# Patient Record
Sex: MALE | Race: WHITE | NOT HISPANIC OR LATINO | Employment: PART TIME | URBAN - METROPOLITAN AREA
[De-identification: names, ages, dates, MRNs, and addresses within clinical notes are randomized per-mention and may not be internally consistent; named-entity substitution may affect disease eponyms.]

---

## 2017-08-01 ENCOUNTER — HOSPITAL ENCOUNTER (OUTPATIENT)
Dept: PREADMISSION TESTING | Facility: HOSPITAL | Age: 67
Discharge: HOME | End: 2017-08-01
Attending: UROLOGY | Admitting: UROLOGY

## 2017-08-01 LAB
ANION GAP SERPL CALC-SCNC: 14.3 MMOL/L (ref 10–20)
BASOPHILS # BLD AUTO: 0.1 10*3/UL (ref 0–0.2)
BASOPHILS NFR BLD AUTO: 1 % (ref 0–2)
BUN SERPL-MCNC: 12 MG/DL (ref 8–20)
BUN/CREAT SERPL: 10 (ref 6.2–20.3)
CALCIUM SERPL-MCNC: 9.6 MG/DL (ref 8.9–10.3)
CHLORIDE SERPL-SCNC: 99 MMOL/L (ref 101–111)
CONV CO2: 21 MMOL/L (ref 22–32)
CREAT UR-MCNC: 1.2 MG/DL (ref 0.7–1.2)
DIFFERENTIAL METHOD BLD: (no result)
EOSINOPHIL # BLD AUTO: 0.2 10*3/UL (ref 0–0.3)
EOSINOPHIL # BLD AUTO: 3 % (ref 0–3)
ERYTHROCYTE [DISTWIDTH] IN BLOOD BY AUTOMATED COUNT: 13.7 % (ref 11.5–14.5)
GLUCOSE SERPL-MCNC: 198 MG/DL (ref 65–99)
HCT VFR BLD AUTO: 41.6 % (ref 40–54)
HGB BLD-MCNC: 14.2 G/DL (ref 14–18)
LYMPHOCYTES # BLD AUTO: 1.3 10*3/UL (ref 0.8–4.8)
LYMPHOCYTES NFR BLD AUTO: 17 % (ref 18–42)
MCH RBC QN AUTO: 32.1 PG (ref 26–32)
MCHC RBC AUTO-ENTMCNC: 34.1 G/DL (ref 32–36)
MCV RBC AUTO: 94.3 FL (ref 80–94)
MONOCYTES # BLD AUTO: 1 10*3/UL (ref 0.1–1.3)
MONOCYTES NFR BLD AUTO: 13 % (ref 2–11)
NEUTROPHILS # BLD AUTO: 5.1 10*3/UL (ref 2.3–8.6)
NEUTROPHILS NFR BLD AUTO: 66 % (ref 50–75)
NRBC BLD AUTO-RTO: 0 /100{WBCS}
NRBC/RBC NFR BLD MANUAL: 0 10*3/UL
PLATELET # BLD AUTO: 278 10*3/UL (ref 150–450)
PMV BLD AUTO: 7.8 FL (ref 7.4–10.4)
POTASSIUM SERPL-SCNC: 4.3 MMOL/L (ref 3.6–5.1)
RBC # BLD AUTO: 4.41 10*6/UL (ref 4.6–6)
SODIUM SERPL-SCNC: 130 MMOL/L (ref 136–144)
WBC # BLD AUTO: 7.6 10*3/UL (ref 4.5–11.5)

## 2018-04-16 ENCOUNTER — OFFICE VISIT (OUTPATIENT)
Dept: ENDOCRINOLOGY | Facility: CLINIC | Age: 68
End: 2018-04-16

## 2018-04-16 VITALS
HEART RATE: 61 BPM | WEIGHT: 212.6 LBS | SYSTOLIC BLOOD PRESSURE: 140 MMHG | OXYGEN SATURATION: 99 % | HEIGHT: 71 IN | DIASTOLIC BLOOD PRESSURE: 84 MMHG | BODY MASS INDEX: 29.76 KG/M2

## 2018-04-16 DIAGNOSIS — Z79.4 TYPE 2 DIABETES MELLITUS WITHOUT COMPLICATION, WITH LONG-TERM CURRENT USE OF INSULIN (HCC): Primary | ICD-10-CM

## 2018-04-16 DIAGNOSIS — E11.9 TYPE 2 DIABETES MELLITUS WITHOUT COMPLICATION, WITH LONG-TERM CURRENT USE OF INSULIN (HCC): Primary | ICD-10-CM

## 2018-04-16 LAB — GLUCOSE BLDC GLUCOMTR-MCNC: 102 MG/DL (ref 70–130)

## 2018-04-16 PROCEDURE — 82947 ASSAY GLUCOSE BLOOD QUANT: CPT | Performed by: PHYSICIAN ASSISTANT

## 2018-04-16 PROCEDURE — 99204 OFFICE O/P NEW MOD 45 MIN: CPT | Performed by: PHYSICIAN ASSISTANT

## 2018-04-16 RX ORDER — CARVEDILOL 3.12 MG/1
TABLET ORAL
COMMUNITY
Start: 2018-04-09 | End: 2021-09-22 | Stop reason: DRUGHIGH

## 2018-04-16 RX ORDER — CALCIUM CITRATE/VITAMIN D3 200MG-6.25
TABLET ORAL
COMMUNITY
Start: 2018-04-05

## 2018-04-16 RX ORDER — FENOFIBRATE 145 MG/1
145 TABLET, COATED ORAL DAILY
COMMUNITY
Start: 2018-04-09

## 2018-04-16 RX ORDER — ISOSORBIDE MONONITRATE 30 MG/1
30 TABLET, EXTENDED RELEASE ORAL DAILY
COMMUNITY
Start: 2018-04-05

## 2018-04-16 RX ORDER — PRAVASTATIN SODIUM 40 MG
20 TABLET ORAL DAILY
COMMUNITY
Start: 2018-03-09

## 2018-04-16 RX ORDER — FLUTICASONE PROPIONATE 50 MCG
SPRAY, SUSPENSION (ML) NASAL
Refills: 3 | COMMUNITY
Start: 2018-02-20

## 2018-04-16 RX ORDER — DIPHENHYDRAMINE HCL 25 MG
TABLET ORAL
COMMUNITY
Start: 2018-03-06

## 2018-04-16 RX ORDER — RIVAROXABAN 20 MG/1
TABLET, FILM COATED ORAL DAILY
Refills: 2 | COMMUNITY
Start: 2018-02-28

## 2018-04-16 RX ORDER — LOSARTAN POTASSIUM 50 MG/1
50 TABLET ORAL 2 TIMES DAILY
COMMUNITY
Start: 2018-03-06

## 2018-04-16 RX ORDER — OMEPRAZOLE 40 MG/1
40 CAPSULE, DELAYED RELEASE ORAL AS NEEDED
COMMUNITY
Start: 2018-03-06

## 2018-04-16 RX ORDER — GLUCOSAM/CHON-MSM1/C/MANG/BOSW 500-416.6
TABLET ORAL
COMMUNITY
Start: 2018-04-05

## 2018-04-16 RX ORDER — DRONEDARONE 400 MG/1
400 TABLET, FILM COATED ORAL 2 TIMES DAILY WITH MEALS
COMMUNITY
Start: 2018-04-13

## 2018-04-16 RX ORDER — NITROGLYCERIN 0.4 MG/1
0.4 TABLET SUBLINGUAL AS NEEDED
COMMUNITY
Start: 2018-03-06

## 2018-04-16 NOTE — PROGRESS NOTES
"Chief Complaint  Establish care for Diabetes Mellitus.    HPI   Neno Green is a 68 y.o. male with afib on multaq and xarelto, who is here today for evaluation of Diabetes Mellitus type 2. Patient was referred by Aureliano Aguilar MD. The initial diagnosis of diabetes was made at age 48,     A1c 6.4 (3/22/18)  Labs reviewed:  3/22/18- LDL 85, , GFR 67, Cr 1.26, LFTs wnl, Hgb 12.5, Hct 37.9, TSH 2.94    Diabetic complications: none  Eye exam current (within one year): 12/2017. No retinopathy  Foot care and dental care: discussed. Wears shoes at all times. Takes good care of feet, checks daily.     Current diabetic medications include:  Novolog, on insulin pump  Metformin 1000mg BID  Statin: pravastatin 40, tricor    Past medications: po meds    Diabetic Monitoring  - checks glucose 3x/day  Glucose is averaging- ~100-130  Overriding morning bolus- pump wants to give him too much insulin  Hypoglycemia- 2x/month. Starts to feel low around 70.  Home blood sugar records: glucometer downloaded, data reviewed and scanned to chart    Nutrition:     Current diet: in general, a \"healthy\" diet  , on average, 2-3 meals per day plus snacks. Drinks diet soda.   Current exercise: none  Seen RD in past: yes, years ago    The following portions of the patient's history were reviewed and updated by me as appropriate: allergies, current medications, past family history, past social history, past surgical history and problem list.    Past Medical History:   Diagnosis Date   • Acid reflux    • Arthritis    • Asthma    • Depression    • Heart attack    • Heart disease    • Hyperlipidemia    • Hypertension    • Migraine    • Type 2 diabetes mellitus        Medications    Current Outpatient Prescriptions:   •  Blood Glucose Monitoring Suppl (TRUE METRIX AIR GLUCOSE METER) w/Device kit, , Disp: , Rfl:   •  carvedilol (COREG) 3.125 MG tablet, , Disp: , Rfl:   •  fenofibrate (TRICOR) 145 MG tablet, , Disp: , Rfl:   •  fluticasone " (FLONASE) 50 MCG/ACT nasal spray, SHAKE LQ AND U 2 SPRAYS IEN D, Disp: , Rfl: 3  •  isosorbide mononitrate (IMDUR) 30 MG 24 hr tablet, , Disp: , Rfl:   •  losartan (COZAAR) 50 MG tablet, , Disp: , Rfl:   •  metFORMIN (GLUCOPHAGE) 1000 MG tablet, , Disp: , Rfl:   •  MULTAQ 400 MG tablet, , Disp: , Rfl:   •  nitroglycerin (NITROSTAT) 0.4 MG SL tablet, , Disp: , Rfl:   •  NOVOLOG 100 UNIT/ML injection, , Disp: , Rfl:   •  omeprazole (priLOSEC) 40 MG capsule, , Disp: , Rfl:   •  pravastatin (PRAVACHOL) 40 MG tablet, , Disp: , Rfl:   •  TRUE METRIX BLOOD GLUCOSE TEST test strip, , Disp: , Rfl:   •  TRUEPLUS LANCETS 33G misc, , Disp: , Rfl:   •  XARELTO 20 MG tablet, Take  by mouth Daily., Disp: , Rfl: 2    Review of Systems  Review of Systems   Constitutional: Negative for chills, fatigue, fever and unexpected weight change.   HENT: Negative for ear discharge, ear pain, hearing loss, nosebleeds, rhinorrhea and sore throat.    Eyes: Negative for pain, redness and visual disturbance.   Respiratory: Positive for shortness of breath. Negative for cough and wheezing.    Cardiovascular: Negative for chest pain, palpitations and leg swelling.        Leg cramps   Gastrointestinal: Negative for abdominal pain, blood in stool, constipation, diarrhea, nausea and vomiting.   Endocrine: Negative for cold intolerance, heat intolerance and polydipsia.   Genitourinary: Negative for difficulty urinating, discharge, dysuria, hematuria, penile pain, penile swelling, scrotal swelling, testicular pain and urgency.   Musculoskeletal: Positive for arthralgias, gait problem and myalgias.   Skin: Negative for rash.   Allergic/Immunologic: Negative.    Neurological: Positive for weakness. Negative for dizziness, syncope, numbness and headaches.        Tingling   Hematological: Negative for adenopathy. Does not bruise/bleed easily.   Psychiatric/Behavioral: Negative for sleep disturbance and suicidal ideas. The patient is not nervous/anxious.   "       Physical Exam    /84   Pulse 61   Ht 179.1 cm (70.5\")   Wt 96.4 kg (212 lb 9.6 oz)   SpO2 99%   BMI 30.07 kg/m² Body mass index is 30.07 kg/m².  Physical Exam   Constitutional: He is oriented to person, place, and time. He appears well-developed. No distress.   HENT:   Head: Normocephalic.   Right Ear: External ear normal.   Left Ear: External ear normal.   Nose: Nose normal.   Eyes: Conjunctivae are normal. Right eye exhibits no discharge. Left eye exhibits no discharge. No scleral icterus.   Neck: Neck supple. No JVD present. No tracheal deviation present. No thyromegaly present.   Cardiovascular: Normal rate, regular rhythm, normal heart sounds and intact distal pulses.    No murmur heard.  Pulmonary/Chest: Effort normal and breath sounds normal. No respiratory distress. He has no wheezes.   Abdominal: Soft. Bowel sounds are normal. There is no tenderness.   Musculoskeletal: He exhibits no edema or tenderness.    Neno had a diabetic foot exam performed (no calluses or ulcers) today.   During the foot exam he had a monofilament test performed (decreased sensation all digits, but no completely gone).  Vascular Status -  His right foot exhibits normal foot vasculature  and no edema. His left foot exhibits normal foot vasculature  and no edema.  Skin Integrity  -  His right foot skin is intact.His left foot skin is intact..  Neurological: He is alert and oriented to person, place, and time.   Skin: Skin is warm and dry. No rash noted. He is not diaphoretic. No erythema.   Psychiatric: He has a normal mood and affect. His behavior is normal. Judgment and thought content normal.       Labs and Imaging   No results found for: HGBA1C  No results found for any previous visit.     No images are attached to the encounter or orders placed in the encounter.  No Images in the past 120 days found..    Assessment / Plan   Neno was seen today for diabetes.    Diagnoses and all orders for this visit:    Type 2 " diabetes mellitus without complication, with long-term current use of insulin  -     Microalbumin / Creatinine Urine Ratio - Urine, Clean Catch  -     POC Glucose Fingerstick        Diabetes Mellitus 2 is under good control on insulin pump.  -A1c 6.4  -reviewed insulin pump download- increase 0700 carb ratio to 9 as he's having to override morning bolus for less insulin  -discussed need to change out tubing q3days  -continue metformin 1000mg BID  -f/u 3 months    1.  Diet: 3-4 carb servings per meal for females, 4-5 carb servings per meal for males  Spread carb intake throughout the day  Increase lean protein and vegetable intake  Avoid sugary drinks and processed carbs including crackers, cookies, cakes  2.  Exercise: Recommend at least 30 minutes of exercise daily, at least 5 days per week. Increase exercise gradually.   3.  Blood Glucose Goal: Blood glucose goal <150 fasting, <180 2 hr postprandial  4.  Microalbumin due  5.  Education performed during this visit: long term diabetic complications discussed. , annual eye examinations at Ophthalmology discussed, dental hygiene discussed  and foot care reviewed., home glucose monitoring emphasized, all medications, side effects and compliance discussed carefully and Hypoglycemia management and prevention reviewed. Reviewed ‘ABCs’ of diabetes management (respective goals in parentheses):  A1C (<7), blood pressure (<130/80), and cholesterol (LDL <100, if CVD <70).    There are no Patient Instructions on file for this visit.    Follow up: Return in about 3 months (around 7/16/2018).    Discussed the nature of the disease including, risks, complications, implications, management, safe and proper use of medications. Encouraged therapeutic lifestyle changes including low calorie diet with plenty of fruits and vegetables, daily exercise, medication compliance, and keeping scheduled follow up appointments with me and any other providers. Encouraged patient to have  appointment for complete physical, fasting labs, appropriate screenings, and immunizations on an annual basis.    30 min  of 45 min face-to-face visit time spent for coordination of care and counselling regarding identified problems as outlined in the objective, assessment and discussion portions of the documentation.    Trevor Jang PA-C

## 2018-04-17 LAB
ALBUMIN/CREAT UR: <16.1 MG/G CREAT (ref 0–30)
CREAT UR-MCNC: 18.6 MG/DL
MICROALBUMIN UR-MCNC: <3 UG/ML

## 2018-07-23 ENCOUNTER — OFFICE VISIT (OUTPATIENT)
Dept: ENDOCRINOLOGY | Facility: CLINIC | Age: 68
End: 2018-07-23

## 2018-07-23 VITALS
DIASTOLIC BLOOD PRESSURE: 80 MMHG | BODY MASS INDEX: 29.71 KG/M2 | OXYGEN SATURATION: 99 % | HEART RATE: 54 BPM | WEIGHT: 210 LBS | SYSTOLIC BLOOD PRESSURE: 122 MMHG

## 2018-07-23 DIAGNOSIS — E11.9 TYPE 2 DIABETES MELLITUS WITHOUT COMPLICATION, WITH LONG-TERM CURRENT USE OF INSULIN (HCC): Primary | ICD-10-CM

## 2018-07-23 DIAGNOSIS — Z79.4 TYPE 2 DIABETES MELLITUS WITHOUT COMPLICATION, WITH LONG-TERM CURRENT USE OF INSULIN (HCC): Primary | ICD-10-CM

## 2018-07-23 LAB
GLUCOSE BLDC GLUCOMTR-MCNC: 178 MG/DL (ref 70–130)
HBA1C MFR BLD: 6.8 %

## 2018-07-23 PROCEDURE — 99214 OFFICE O/P EST MOD 30 MIN: CPT | Performed by: PHYSICIAN ASSISTANT

## 2018-07-23 PROCEDURE — 82947 ASSAY GLUCOSE BLOOD QUANT: CPT | Performed by: PHYSICIAN ASSISTANT

## 2018-07-23 PROCEDURE — 83036 HEMOGLOBIN GLYCOSYLATED A1C: CPT | Performed by: PHYSICIAN ASSISTANT

## 2018-07-23 NOTE — PROGRESS NOTES
"Chief Complaint  F/u for Diabetes Mellitus.    HPI   Neno Green is a 68 y.o. male with afib on multaq and xarelto, who is here today for f/u of Diabetes Mellitus type 2. The initial diagnosis of diabetes was made at age 48.     A1c- 6.8 (7/23/18), 6.4 (3/22/18)  Has a knee steroid injection 6/2018  Labs reviewed:  3/22/18- LDL 85, , GFR 67, Cr 1.26, LFTs wnl, Hgb 12.5, Hct 37.9, TSH 2.94    Diabetic complications: none  Eye exam current (within one year): 12/2017. No retinopathy  Foot care and dental care: discussed. Wears shoes at all times. Takes good care of feet, checks daily.     Current diabetic medications include:  Novolog, on insulin pump  Metformin 1000mg BID  Statin: pravastatin 40, tricor    Past medications: po meds    Diabetic Monitoring  - checks glucose 3x/day  Glucose is averaging- ~100-120  Hypoglycemia- no  Home blood sugar records: glucometer downloaded, data reviewed and scanned to chart    Nutrition:     Current diet: in general, a \"healthy\" diet  , on average, 2-3 meals per day plus snacks. Drinks diet soda.   Current exercise: none  Seen RD in past: yes, years ago    The following portions of the patient's history were reviewed and updated by me as appropriate: allergies, current medications, past family history, past social history, past surgical history and problem list.    Past Medical History:   Diagnosis Date   • Acid reflux    • Arthritis    • Asthma    • Depression    • Heart attack    • Heart disease    • Hyperlipidemia    • Hypertension    • Migraine    • Type 2 diabetes mellitus (CMS/[facility])        Medications    Current Outpatient Prescriptions:   •  Blood Glucose Monitoring Suppl (TRUE METRIX AIR GLUCOSE METER) w/Device kit, , Disp: , Rfl:   •  carvedilol (COREG) 3.125 MG tablet, , Disp: , Rfl:   •  fenofibrate (TRICOR) 145 MG tablet, , Disp: , Rfl:   •  fluticasone (FLONASE) 50 MCG/ACT nasal spray, SHAKE LQ AND U 2 SPRAYS IEN D, Disp: , Rfl: 3  •  isosorbide mononitrate " (IMDUR) 30 MG 24 hr tablet, , Disp: , Rfl:   •  losartan (COZAAR) 50 MG tablet, , Disp: , Rfl:   •  metFORMIN (GLUCOPHAGE) 1000 MG tablet, , Disp: , Rfl:   •  MULTAQ 400 MG tablet, , Disp: , Rfl:   •  nitroglycerin (NITROSTAT) 0.4 MG SL tablet, , Disp: , Rfl:   •  NOVOLOG 100 UNIT/ML injection, , Disp: , Rfl:   •  omeprazole (priLOSEC) 40 MG capsule, , Disp: , Rfl:   •  pravastatin (PRAVACHOL) 40 MG tablet, , Disp: , Rfl:   •  TRUE METRIX BLOOD GLUCOSE TEST test strip, , Disp: , Rfl:   •  TRUEPLUS LANCETS 33G misc, , Disp: , Rfl:   •  XARELTO 20 MG tablet, Take  by mouth Daily., Disp: , Rfl: 2    Review of Systems  Review of Systems   Constitutional: Negative for chills, fatigue, fever and unexpected weight change.   HENT: Negative for ear discharge, ear pain, hearing loss, nosebleeds, rhinorrhea and sore throat.    Eyes: Negative for pain, redness and visual disturbance.   Respiratory: Positive for shortness of breath. Negative for cough and wheezing.    Cardiovascular: Negative for chest pain, palpitations and leg swelling.        Leg cramps   Gastrointestinal: Negative for abdominal pain, blood in stool, constipation, diarrhea, nausea and vomiting.   Endocrine: Negative for cold intolerance, heat intolerance and polydipsia.   Genitourinary: Negative for difficulty urinating, discharge, dysuria, hematuria, penile pain, penile swelling, scrotal swelling, testicular pain and urgency.   Musculoskeletal: Positive for arthralgias, gait problem and myalgias.   Skin: Negative for rash.   Allergic/Immunologic: Negative.    Neurological: Positive for weakness. Negative for dizziness, syncope, numbness and headaches.        Tingling   Hematological: Negative for adenopathy. Does not bruise/bleed easily.   Psychiatric/Behavioral: Negative for sleep disturbance and suicidal ideas. The patient is not nervous/anxious.         Physical Exam    /80   Pulse 54   Wt 95.3 kg (210 lb)   SpO2 99%   BMI 29.71 kg/m² Body mass  index is 29.71 kg/m².  Physical Exam   Constitutional: He is oriented to person, place, and time. He appears well-developed. No distress.   HENT:   Head: Normocephalic.   Right Ear: External ear normal.   Left Ear: External ear normal.   Nose: Nose normal.   Eyes: Conjunctivae are normal. Right eye exhibits no discharge. Left eye exhibits no discharge. No scleral icterus.   Neck: Neck supple. No JVD present. No tracheal deviation present. No thyromegaly present.   Cardiovascular: Normal rate, regular rhythm, normal heart sounds and intact distal pulses.    No murmur heard.  Pulmonary/Chest: Effort normal and breath sounds normal. No respiratory distress. He has no wheezes.   Abdominal: Soft. Bowel sounds are normal. There is no tenderness.   Musculoskeletal: He exhibits no edema or tenderness.     Vascular Status -  His right foot exhibits normal foot vasculature  and no edema. His left foot exhibits normal foot vasculature  and no edema.  Skin Integrity  -  His right foot skin is intact.His left foot skin is intact..  Neurological: He is alert and oriented to person, place, and time.   Skin: Skin is warm and dry. No rash noted. He is not diaphoretic. No erythema.   Psychiatric: He has a normal mood and affect. His behavior is normal. Judgment and thought content normal.       Labs and Imaging   Lab Results   Component Value Date    HGBA1C 6.8 07/23/2018     Office Visit on 07/23/2018   Component Date Value Ref Range Status   • Glucose 07/23/2018 178* 70 - 130 mg/dL Final   • Hemoglobin A1C 07/23/2018 6.8  % Final     No images are attached to the encounter or orders placed in the encounter.  No Images in the past 120 days found..    Assessment / Plan   Neno was seen today for follow-up.    Diagnoses and all orders for this visit:    Type 2 diabetes mellitus without complication, with long-term current use of insulin (CMS/[facility])  -     POC Glucose Fingerstick  -     POC Glycosylated Hemoglobin (Hb A1C)        Diabetes  Mellitus 2 is under good control on insulin pump.  -A1c 6.8 without hypoglycemia  -reviewed insulin pump download- no changes at this time  -discussed need to change out tubing q3days  -continue metformin 1000mg BID  -f/u 6 months    1.  Diet: 3-4 carb servings per meal for females, 4-5 carb servings per meal for males  Spread carb intake throughout the day  Increase lean protein and vegetable intake  Avoid sugary drinks and processed carbs including crackers, cookies, cakes  2.  Exercise: Recommend at least 30 minutes of exercise daily, at least 5 days per week. Increase exercise gradually.   3.  Blood Glucose Goal: Blood glucose goal <150 fasting, <180 2 hr postprandial  4.  Microalbumin due  5.  Education performed during this visit: long term diabetic complications discussed. , annual eye examinations at Ophthalmology discussed, dental hygiene discussed  and foot care reviewed., home glucose monitoring emphasized, all medications, side effects and compliance discussed carefully and Hypoglycemia management and prevention reviewed. Reviewed ‘ABCs’ of diabetes management (respective goals in parentheses):  A1C (<7), blood pressure (<130/80), and cholesterol (LDL <100, if CVD <70).    There are no Patient Instructions on file for this visit.    Follow up: Return in about 6 months (around 1/23/2019). Pt reports co-pay expensive, requests less frequent follow ups. A1C followed by PCP as well- next appt in 4 months.     Discussed the nature of the disease including, risks, complications, implications, management, safe and proper use of medications. Encouraged therapeutic lifestyle changes including low calorie diet with plenty of fruits and vegetables, daily exercise, medication compliance, and keeping scheduled follow up appointments with me and any other providers. Encouraged patient to have appointment for complete physical, fasting labs, appropriate screenings, and immunizations on an annual basis.    20 min  of 30  min face-to-face visit time spent for coordination of care and counselling regarding identified problems as outlined in the objective, assessment and discussion portions of the documentation.    Trevor Jang PA-C

## 2019-01-22 ENCOUNTER — OFFICE VISIT (OUTPATIENT)
Dept: ENDOCRINOLOGY | Facility: CLINIC | Age: 69
End: 2019-01-22

## 2019-01-22 VITALS
OXYGEN SATURATION: 98 % | DIASTOLIC BLOOD PRESSURE: 72 MMHG | WEIGHT: 214 LBS | BODY MASS INDEX: 30.27 KG/M2 | SYSTOLIC BLOOD PRESSURE: 132 MMHG | HEART RATE: 57 BPM

## 2019-01-22 DIAGNOSIS — E11.9 TYPE 2 DIABETES MELLITUS WITHOUT COMPLICATION, WITH LONG-TERM CURRENT USE OF INSULIN (HCC): Primary | ICD-10-CM

## 2019-01-22 DIAGNOSIS — Z79.4 TYPE 2 DIABETES MELLITUS WITHOUT COMPLICATION, WITH LONG-TERM CURRENT USE OF INSULIN (HCC): Primary | ICD-10-CM

## 2019-01-22 LAB
GLUCOSE BLDC GLUCOMTR-MCNC: 180 MG/DL (ref 70–130)
HBA1C MFR BLD: 7 %

## 2019-01-22 PROCEDURE — 82962 GLUCOSE BLOOD TEST: CPT | Performed by: PHYSICIAN ASSISTANT

## 2019-01-22 PROCEDURE — 99214 OFFICE O/P EST MOD 30 MIN: CPT | Performed by: PHYSICIAN ASSISTANT

## 2019-01-22 PROCEDURE — 83036 HEMOGLOBIN GLYCOSYLATED A1C: CPT | Performed by: PHYSICIAN ASSISTANT

## 2019-01-22 NOTE — PROGRESS NOTES
"Chief Complaint  F/u for Diabetes Mellitus.    HPI   Neno Green is a 69 y.o. male with afib on multaq and xarelto, who is here today for f/u of Diabetes Mellitus type 2. The initial diagnosis of diabetes was made at age 48.     2 cortisone injections to knee since last visit. Last one 1/7/19.   Has knee replacement scheduled 3/2019.     A1c- 7 (1/22/19), 6.2 (9/21/18), 6.8 (7/23/18), 6.4 (3/22/18)    Labs reviewed:  9/21/18- LDL 63, , TSH 2.21, H/H ok, GFR 61    Diabetic complications: none  Eye exam current (within one year): 12/2017. No retinopathy  Foot care and dental care: discussed. Wears shoes at all times. Takes good care of feet, checks daily.     Current diabetic medications include:  Novolog, on insulin pump  Metformin 1000mg BID  Statin: pravastatin 40, tricor    Past medications: po meds    Diabetic Monitoring  -   Medtronic pump downloaded and reviewed- checking BS 1-3x/day, bs ranging 100-130    Nutrition:     Current diet: in general, a \"healthy\" diet  , on average, 2-3 meals per day plus snacks. Drinks diet soda.   Current exercise: none  Seen RD in past: yes, years ago    The following portions of the patient's history were reviewed and updated by me as appropriate: allergies, current medications, past family history, past social history, past surgical history and problem list.    Past Medical History:   Diagnosis Date   • Acid reflux    • Arthritis    • Asthma    • Depression    • Heart attack (CMS/HCC)    • Heart disease    • Hyperlipidemia    • Hypertension    • Migraine    • Type 2 diabetes mellitus (CMS/[facility])        Medications    Current Outpatient Medications:   •  Blood Glucose Monitoring Suppl (TRUE METRIX AIR GLUCOSE METER) w/Device kit, , Disp: , Rfl:   •  carvedilol (COREG) 3.125 MG tablet, , Disp: , Rfl:   •  fenofibrate (TRICOR) 145 MG tablet, , Disp: , Rfl:   •  fluticasone (FLONASE) 50 MCG/ACT nasal spray, SHAKE LQ AND U 2 SPRAYS IEN D, Disp: , Rfl: 3  •  isosorbide " mononitrate (IMDUR) 30 MG 24 hr tablet, , Disp: , Rfl:   •  losartan (COZAAR) 50 MG tablet, , Disp: , Rfl:   •  metFORMIN (GLUCOPHAGE) 1000 MG tablet, , Disp: , Rfl:   •  MULTAQ 400 MG tablet, , Disp: , Rfl:   •  nitroglycerin (NITROSTAT) 0.4 MG SL tablet, , Disp: , Rfl:   •  NOVOLOG 100 UNIT/ML injection, , Disp: , Rfl:   •  omeprazole (priLOSEC) 40 MG capsule, , Disp: , Rfl:   •  pravastatin (PRAVACHOL) 40 MG tablet, , Disp: , Rfl:   •  TRUE METRIX BLOOD GLUCOSE TEST test strip, , Disp: , Rfl:   •  TRUEPLUS LANCETS 33G misc, , Disp: , Rfl:   •  XARELTO 20 MG tablet, Take  by mouth Daily., Disp: , Rfl: 2    Review of Systems  Review of Systems   Constitutional: Negative for chills, fatigue, fever and unexpected weight change.   HENT: Negative for ear discharge, ear pain, hearing loss, nosebleeds, rhinorrhea and sore throat.    Eyes: Negative for pain, redness and visual disturbance.   Respiratory: Positive for shortness of breath. Negative for cough and wheezing.    Cardiovascular: Negative for chest pain, palpitations and leg swelling.        Leg cramps   Gastrointestinal: Negative for abdominal pain, blood in stool, constipation, diarrhea, nausea and vomiting.   Endocrine: Negative for cold intolerance, heat intolerance and polydipsia.   Genitourinary: Negative for difficulty urinating, discharge, dysuria, hematuria, penile pain, penile swelling, scrotal swelling, testicular pain and urgency.   Musculoskeletal: Positive for arthralgias, gait problem and myalgias.   Skin: Negative for rash.   Allergic/Immunologic: Negative.    Neurological: Positive for weakness. Negative for dizziness, syncope, numbness and headaches.        Tingling   Hematological: Negative for adenopathy. Does not bruise/bleed easily.   Psychiatric/Behavioral: Negative for sleep disturbance and suicidal ideas. The patient is not nervous/anxious.         Physical Exam    /72   Pulse 57   Wt 97.1 kg (214 lb)   SpO2 98%   BMI 30.27  kg/m² Body mass index is 30.27 kg/m².  Physical Exam   Constitutional: He is oriented to person, place, and time. He appears well-developed. No distress.   HENT:   Head: Normocephalic.   Right Ear: External ear normal.   Left Ear: External ear normal.   Nose: Nose normal.   Eyes: Conjunctivae are normal. Right eye exhibits no discharge. Left eye exhibits no discharge. No scleral icterus.   Neck: Neck supple. No JVD present. No tracheal deviation present. No thyromegaly present.   Cardiovascular: Normal rate, regular rhythm, normal heart sounds and intact distal pulses.   No murmur heard.  Pulmonary/Chest: Effort normal and breath sounds normal. No respiratory distress. He has no wheezes.   Abdominal: Soft. Bowel sounds are normal. There is no tenderness.   Musculoskeletal: He exhibits no edema or tenderness.   Neurological: He is alert and oriented to person, place, and time.   Skin: Skin is warm and dry. No rash noted. He is not diaphoretic. No erythema.   Psychiatric: He has a normal mood and affect. His behavior is normal. Judgment and thought content normal.       Labs and Imaging   Lab Results   Component Value Date    HGBA1C 7 01/22/2019    HGBA1C 6.8 07/23/2018     Office Visit on 01/22/2019   Component Date Value Ref Range Status   • Glucose 01/22/2019 180* 70 - 130 mg/dL Final   • Hemoglobin A1C 01/22/2019 7  % Final     No images are attached to the encounter or orders placed in the encounter.  No Images in the past 120 days found..    Assessment / Plan   Neno was seen today for follow-up.    Diagnoses and all orders for this visit:    Type 2 diabetes mellitus without complication, with long-term current use of insulin (CMS/[facility])  -     POC Glucose Fingerstick  -     POC Glycosylated Hemoglobin (Hb A1C)        Diabetes Mellitus 2 is under good control on insulin pump.  -A1c 7, up from last time, likely d/t 2 cortisone injections since last visit, his current BS readings range from 100-130 per pump  review  -reviewed insulin pump download- bs 100-130, no hypoglycemia, no changes at this time  -discussed need to change infusion set q3days  -continue metformin 1000mg BID  -f/u 6 months  -refills from pcp, pt prefers this way    1.  Diet: 3-4 carb servings per meal for females, 4-5 carb servings per meal for males  Spread carb intake throughout the day  Increase lean protein and vegetable intake  Avoid sugary drinks and processed carbs including crackers, cookies, cakes  2.  Exercise: Recommend at least 30 minutes of exercise daily, at least 5 days per week. Increase exercise gradually.   3.  Blood Glucose Goal: Blood glucose goal <150 fasting, <180 2 hr postprandial  4.  Microalbumin due 4/2019  5.  Education performed during this visit: long term diabetic complications discussed. , annual eye examinations at Ophthalmology discussed, dental hygiene discussed  and foot care reviewed., home glucose monitoring emphasized, all medications, side effects and compliance discussed carefully and Hypoglycemia management and prevention reviewed. Reviewed ‘ABCs’ of diabetes management (respective goals in parentheses):  A1C (<7), blood pressure (<130/80), and cholesterol (LDL <100, if CVD <70).    There are no Patient Instructions on file for this visit.    Follow up: Return in about 6 months (around 7/22/2019). Pt reports co-pay expensive, requests follow up q6 months. He follows up with pcp regularly as well.     Discussed the nature of the disease including, risks, complications, implications, management, safe and proper use of medications. Encouraged therapeutic lifestyle changes including low calorie diet with plenty of fruits and vegetables, daily exercise, medication compliance, and keeping scheduled follow up appointments with me and any other providers. Encouraged patient to have appointment for complete physical, fasting labs, appropriate screenings, and immunizations on an annual basis.    20 min  of 30 min  face-to-face visit time spent for coordination of care and counselling regarding identified problems as outlined in the objective, assessment and discussion portions of the documentation.    Trevor Jang PA-C

## 2019-08-19 ENCOUNTER — OFFICE VISIT (OUTPATIENT)
Dept: ENDOCRINOLOGY | Facility: CLINIC | Age: 69
End: 2019-08-19

## 2019-08-19 VITALS
HEART RATE: 66 BPM | OXYGEN SATURATION: 98 % | DIASTOLIC BLOOD PRESSURE: 80 MMHG | WEIGHT: 211 LBS | BODY MASS INDEX: 29.85 KG/M2 | SYSTOLIC BLOOD PRESSURE: 140 MMHG

## 2019-08-19 DIAGNOSIS — Z79.4 TYPE 2 DIABETES MELLITUS WITHOUT COMPLICATION, WITH LONG-TERM CURRENT USE OF INSULIN (HCC): Primary | ICD-10-CM

## 2019-08-19 DIAGNOSIS — E11.9 TYPE 2 DIABETES MELLITUS WITHOUT COMPLICATION, WITH LONG-TERM CURRENT USE OF INSULIN (HCC): Primary | ICD-10-CM

## 2019-08-19 LAB
GLUCOSE BLDC GLUCOMTR-MCNC: 116 MG/DL (ref 70–130)
HBA1C MFR BLD: 6.5 %

## 2019-08-19 PROCEDURE — 83036 HEMOGLOBIN GLYCOSYLATED A1C: CPT | Performed by: PHYSICIAN ASSISTANT

## 2019-08-19 PROCEDURE — 82947 ASSAY GLUCOSE BLOOD QUANT: CPT | Performed by: PHYSICIAN ASSISTANT

## 2019-08-19 PROCEDURE — 99213 OFFICE O/P EST LOW 20 MIN: CPT | Performed by: PHYSICIAN ASSISTANT

## 2019-08-19 NOTE — PROGRESS NOTES
"Chief Complaint  F/u for Diabetes Mellitus.    HPI   Neno Green is a 69 y.o. male with afib on multaq and xarelto, who is here today for f/u of Diabetes Mellitus type 2. The initial diagnosis of diabetes was made at age 48.     Oct 2 will have left knee replacement.   Eye exam utd, Dr Lisa Matthews.     A1c- 6.5 (8/19/19), 7 (1/22/19), 6.2 (9/21/18), 6.8 (7/23/18), 6.4 (3/22/18)    Diabetic complications: none  Eye exam current (within one year): 12/2017. No retinopathy  Foot care and dental care: discussed. Wears shoes at all times. Takes good care of feet, checks daily.     Current diabetic medications include:  Novolog, on insulin pump  Metformin 1000mg BID  Statin: pravastatin 40, tricor    Past medications: po meds    Diabetic Monitoring  -   Medtronic pump downloaded and reviewed- he usually just puts bs of 100 into pump, he is counting carbs. He denies hypoglycemia.     Nutrition:     Current diet: in general, a \"healthy\" diet  , on average, 2-3 meals per day plus snacks. Drinks diet soda.   Current exercise: none  Seen RD in past: yes, years ago    The following portions of the patient's history were reviewed and updated by me as appropriate: allergies, current medications, past family history, past social history, past surgical history and problem list.    Past Medical History:   Diagnosis Date   • Acid reflux    • Arthritis    • Asthma    • Depression    • Heart attack (CMS/HCC)    • Heart disease    • Hyperlipidemia    • Hypertension    • Migraine    • Type 2 diabetes mellitus (CMS/[facility])        Medications    Current Outpatient Medications:   •  Blood Glucose Monitoring Suppl (TRUE METRIX AIR GLUCOSE METER) w/Device kit, , Disp: , Rfl:   •  carvedilol (COREG) 3.125 MG tablet, , Disp: , Rfl:   •  fenofibrate (TRICOR) 145 MG tablet, , Disp: , Rfl:   •  fluticasone (FLONASE) 50 MCG/ACT nasal spray, SHAKE LQ AND U 2 SPRAYS IEN D, Disp: , Rfl: 3  •  isosorbide mononitrate (IMDUR) 30 MG 24 hr tablet, , Disp: " , Rfl:   •  losartan (COZAAR) 50 MG tablet, , Disp: , Rfl:   •  metFORMIN (GLUCOPHAGE) 1000 MG tablet, , Disp: , Rfl:   •  MULTAQ 400 MG tablet, , Disp: , Rfl:   •  nitroglycerin (NITROSTAT) 0.4 MG SL tablet, , Disp: , Rfl:   •  NOVOLOG 100 UNIT/ML injection, , Disp: , Rfl:   •  omeprazole (priLOSEC) 40 MG capsule, , Disp: , Rfl:   •  pravastatin (PRAVACHOL) 40 MG tablet, , Disp: , Rfl:   •  TRUE METRIX BLOOD GLUCOSE TEST test strip, , Disp: , Rfl:   •  TRUEPLUS LANCETS 33G misc, , Disp: , Rfl:   •  XARELTO 20 MG tablet, Take  by mouth Daily., Disp: , Rfl: 2    Review of Systems  Review of Systems   Constitutional: Negative for chills, fatigue, fever and unexpected weight change.   HENT: Negative for ear discharge, ear pain, hearing loss, nosebleeds, rhinorrhea and sore throat.    Eyes: Negative for pain, redness and visual disturbance.   Respiratory: Positive for shortness of breath. Negative for cough and wheezing.    Cardiovascular: Negative for chest pain, palpitations and leg swelling.        Leg cramps   Gastrointestinal: Negative for abdominal pain, blood in stool, constipation, diarrhea, nausea and vomiting.   Endocrine: Negative for cold intolerance, heat intolerance and polydipsia.   Genitourinary: Negative for difficulty urinating, discharge, dysuria, hematuria, penile pain, penile swelling, scrotal swelling, testicular pain and urgency.   Musculoskeletal: Positive for arthralgias, gait problem and myalgias.   Skin: Negative for rash.   Allergic/Immunologic: Negative.    Neurological: Positive for weakness. Negative for dizziness, syncope, numbness and headaches.        Tingling   Hematological: Negative for adenopathy. Does not bruise/bleed easily.   Psychiatric/Behavioral: Negative for sleep disturbance and suicidal ideas. The patient is not nervous/anxious.         Physical Exam    /80   Pulse 66   Wt 95.7 kg (211 lb)   SpO2 98%   BMI 29.85 kg/m² Body mass index is 29.85 kg/m².  Physical Exam    Constitutional: He is oriented to person, place, and time. He appears well-developed. No distress.   HENT:   Head: Normocephalic.   Right Ear: External ear normal.   Left Ear: External ear normal.   Nose: Nose normal.   Eyes: Conjunctivae are normal. Right eye exhibits no discharge. Left eye exhibits no discharge. No scleral icterus.   Neck: Neck supple. No JVD present. No tracheal deviation present. No thyromegaly present.   Cardiovascular: Normal rate, regular rhythm, normal heart sounds and intact distal pulses.   No murmur heard.  Pulmonary/Chest: Effort normal and breath sounds normal. No respiratory distress. He has no wheezes.   Abdominal: Soft. Bowel sounds are normal. There is no tenderness.   Musculoskeletal: He exhibits no edema or tenderness.    Neno had a diabetic foot exam performed today.   During the foot exam he had a monofilament test performed.    Neurological Sensory Findings -  Unaltered sharp/dull right ankle/foot discrimination and unaltered sharp/dull left ankle/foot discrimination.  Vascular Status -  His right foot exhibits normal foot vasculature  and no edema. His left foot exhibits normal foot vasculature  and no edema.  Skin Integrity  -  His right foot skin is intact.  He has no right foot onychomycosis, no right foot ulcer and non-callous right foot.His left foot skin is intact. He has no left foot onychomycosis, no left foot ulcer and non-callous left foot..  Neurological: He is alert and oriented to person, place, and time.   Skin: Skin is warm and dry. No rash noted. He is not diaphoretic. No erythema.   Psychiatric: He has a normal mood and affect. His behavior is normal. Judgment and thought content normal.       Labs and Imaging   Lab Results   Component Value Date    HGBA1C 6.5 08/19/2019    HGBA1C 7 01/22/2019    HGBA1C 6.8 07/23/2018     Office Visit on 08/19/2019   Component Date Value Ref Range Status   • Glucose 08/19/2019 116  70 - 130 mg/dL Final   • Hemoglobin A1C  08/19/2019 6.5  % Final     No images are attached to the encounter or orders placed in the encounter.  No Images in the past 120 days found..    Assessment / Plan   Neno was seen today for follow-up.    Diagnoses and all orders for this visit:    Type 2 diabetes mellitus without complication, with long-term current use of insulin (CMS/[facility])  -     POC Glucose Fingerstick  -     POC Glycosylated Hemoglobin (Hb A1C)  -     Microalbumin / Creatinine Urine Ratio - Urine, Clean Catch        Diabetes Mellitus 2 is under good control on insulin pump.  -A1c 6.5  -no hypoglycemia  -reviewed insulin pump download- see above  -he's adding bs of 100 into pump. Discussed to add actual bs reading if bs >150 or <70  -discussed need to change infusion set q3days  -continue metformin 1000mg BID  -f/u 6 months  -refills from pcp, pt prefers this way    1.  Diet: 3-4 carb servings per meal for females, 4-5 carb servings per meal for males  Spread carb intake throughout the day  Increase lean protein and vegetable intake  Avoid sugary drinks and processed carbs including crackers, cookies, cakes  2.  Exercise: Recommend at least 30 minutes of exercise daily, at least 5 days per week. Increase exercise gradually.   3.  Blood Glucose Goal: Blood glucose goal <150 fasting, <180 2 hr postprandial  4.  Microalbumin due 4/2019  5.  Education performed during this visit: long term diabetic complications discussed. , annual eye examinations at Ophthalmology discussed, dental hygiene discussed  and foot care reviewed., home glucose monitoring emphasized, all medications, side effects and compliance discussed carefully and Hypoglycemia management and prevention reviewed. Reviewed ‘ABCs’ of diabetes management (respective goals in parentheses):  A1C (<7), blood pressure (<130/80), and cholesterol (LDL <100, if CVD <70).    There are no Patient Instructions on file for this visit.    Follow up: Return in about 6 months (around 2/19/2020). Pt  reports co-pay expensive, requests follow up q6 months. He follows up with pcp regularly as well.     Discussed the nature of the disease including, risks, complications, implications, management, safe and proper use of medications. Encouraged therapeutic lifestyle changes including low calorie diet with plenty of fruits and vegetables, daily exercise, medication compliance, and keeping scheduled follow up appointments with me and any other providers. Encouraged patient to have appointment for complete physical, fasting labs, appropriate screenings, and immunizations on an annual basis.      Trevor Jang PA-C

## 2019-08-20 LAB
ALBUMIN/CREAT UR: <8.6 MG/G CREAT (ref 0–30)
CREAT UR-MCNC: 34.8 MG/DL
MICROALBUMIN UR-MCNC: <3 UG/ML

## 2020-02-19 ENCOUNTER — OFFICE VISIT (OUTPATIENT)
Dept: ENDOCRINOLOGY | Facility: CLINIC | Age: 70
End: 2020-02-19

## 2020-02-19 ENCOUNTER — OFFICE VISIT (OUTPATIENT)
Dept: DIABETES SERVICES | Facility: HOSPITAL | Age: 70
End: 2020-02-19

## 2020-02-19 VITALS
OXYGEN SATURATION: 98 % | HEART RATE: 60 BPM | WEIGHT: 217.8 LBS | BODY MASS INDEX: 30.81 KG/M2 | DIASTOLIC BLOOD PRESSURE: 80 MMHG | SYSTOLIC BLOOD PRESSURE: 140 MMHG

## 2020-02-19 DIAGNOSIS — E11.9 TYPE 2 DIABETES MELLITUS WITHOUT COMPLICATION, WITH LONG-TERM CURRENT USE OF INSULIN (HCC): Primary | ICD-10-CM

## 2020-02-19 DIAGNOSIS — Z79.4 TYPE 2 DIABETES MELLITUS WITHOUT COMPLICATION, WITH LONG-TERM CURRENT USE OF INSULIN (HCC): Primary | ICD-10-CM

## 2020-02-19 LAB
GLUCOSE BLDC GLUCOMTR-MCNC: 73 MG/DL (ref 70–130)
HBA1C MFR BLD: 6.8 %

## 2020-02-19 PROCEDURE — 82947 ASSAY GLUCOSE BLOOD QUANT: CPT | Performed by: PHYSICIAN ASSISTANT

## 2020-02-19 PROCEDURE — 99214 OFFICE O/P EST MOD 30 MIN: CPT | Performed by: PHYSICIAN ASSISTANT

## 2020-02-19 PROCEDURE — 83036 HEMOGLOBIN GLYCOSYLATED A1C: CPT | Performed by: PHYSICIAN ASSISTANT

## 2020-02-19 PROCEDURE — G0108 DIAB MANAGE TRN  PER INDIV: HCPCS

## 2020-02-19 NOTE — CONSULTS
"Mr Green was seen today for assistance with his diabetes management. He reports that he has been living with diabetes since the age 48 and has been using an insulin pump, medtronic minimed for the past 10 years. He admits today, that he has been \"guessing\" at carb counting since using the pump and is requesting help with this skill. He admits to not entering in his correct glucose reading into bolus wizard for fear of hypoglycemia and would in 100 every time and then would guess the number of carbs. He did have pump adjustments today by provider that should help improve control and decrease chance of hypoglycemia. Discussed with patient the importance of carbohydrates in our diet and the role they play with blood sugar. A visual drawing was used showing the effects of carbs, fats and proteins on blood sugar. Discussed one serving of carbs is measured as 15 grams. By using Nutrition Basics booklet, it is recommended per ADA that men can have 4-5 servings of carbs per meal. Discussed serving sizes and examples were given. He downloaded [employer] vero and was able to meal plan using the vero, and chose a meal from [employer] that satisfied him and stayed in the recommended guidelines of carbohydrates. Encouraged healthy protein with his carb meals and snacks to help maintain blood sugar and stay satisfied longer. Reviewed healthy plate method of eating and meal planning.  A copy of Planning Healthy meals, by [employer] was given. He expressed satisfaction with todays visit. Encouraged him to review the given material and to contact his provider should he need any further assistance. Thank you for this referral.  "

## 2020-02-19 NOTE — PROGRESS NOTES
"Chief Complaint  F/u for Diabetes Mellitus.    HPI   Neno Green is a 70 y.o. male with afib on multaq and xarelto, who is here today for f/u of Diabetes Mellitus type 2. The initial diagnosis of diabetes was made at age 48.     Had labs with PCP within the last 2 months.   Eye exam utd, Dr Lisa Matthews.     A1c- 6.5 (8/19/19), 7 (1/22/19), 6.2 (9/21/18), 6.8 (7/23/18), 6.4 (3/22/18)    Diabetic complications: none  Eye exam current (within one year): 12/2017. No retinopathy  Foot care and dental care: discussed. Wears shoes at all times. Takes good care of feet, checks daily.     Current diabetic medications include:  Novolog, on insulin pump  Metformin 1000mg BID  Statin: pravastatin 40, tricor    Past medications: po meds    Diabetic Monitoring  -   Medtronic pump downloaded and reviewed- changing infusion set every 5-6 days, he's adding carbs 1-2x/day but not actually counting carbs, he's basically estimating to account for his food and correction in the carbs, still adding \"100\" when he enters bs readings instead of the actual number to avoid hypoglycemia (if he enters a high suger that usually results in hypoglycemia). He denies frequent hypoglycemia    Nutrition:     Current diet: in general, a \"healthy\" diet  , on average, 2-3 meals per day plus snacks. Drinks diet soda.   Current exercise: none  Seen RD in past: yes, years ago    The following portions of the patient's history were reviewed and updated by me as appropriate: allergies, current medications, past family history, past social history, past surgical history and problem list.    Past Medical History:   Diagnosis Date   • Acid reflux    • Arthritis    • Asthma    • Depression    • Heart attack (CMS/HCC)    • Heart disease    • Hyperlipidemia    • Hypertension    • Migraine    • Type 2 diabetes mellitus (CMS/[facility])        Medications    Current Outpatient Medications:   •  Blood Glucose Monitoring Suppl (TRUE METRIX AIR GLUCOSE METER) w/Device " kit, , Disp: , Rfl:   •  carvedilol (COREG) 3.125 MG tablet, , Disp: , Rfl:   •  fenofibrate (TRICOR) 145 MG tablet, , Disp: , Rfl:   •  fluticasone (FLONASE) 50 MCG/ACT nasal spray, SHAKE LQ AND U 2 SPRAYS IEN D, Disp: , Rfl: 3  •  isosorbide mononitrate (IMDUR) 30 MG 24 hr tablet, , Disp: , Rfl:   •  losartan (COZAAR) 50 MG tablet, , Disp: , Rfl:   •  metFORMIN (GLUCOPHAGE) 1000 MG tablet, 1,000 mg 2 (Two) Times a Day With Meals., Disp: , Rfl:   •  MULTAQ 400 MG tablet, , Disp: , Rfl:   •  nitroglycerin (NITROSTAT) 0.4 MG SL tablet, , Disp: , Rfl:   •  NOVOLOG 100 UNIT/ML injection, , Disp: , Rfl:   •  omeprazole (priLOSEC) 40 MG capsule, , Disp: , Rfl:   •  pravastatin (PRAVACHOL) 40 MG tablet, , Disp: , Rfl:   •  TRUE METRIX BLOOD GLUCOSE TEST test strip, , Disp: , Rfl:   •  TRUEPLUS LANCETS 33G misc, , Disp: , Rfl:   •  XARELTO 20 MG tablet, Take  by mouth Daily., Disp: , Rfl: 2    Review of Systems  Review of Systems   Constitutional: Negative for chills, fatigue, fever and unexpected weight change.   HENT: Negative for ear discharge, ear pain, hearing loss, nosebleeds, rhinorrhea and sore throat.    Eyes: Negative for pain, redness and visual disturbance.   Respiratory: Negative for cough and wheezing.    Cardiovascular: Negative for chest pain, palpitations and leg swelling.        Leg cramps   Gastrointestinal: Negative for abdominal pain, blood in stool, constipation, diarrhea, nausea and vomiting.   Endocrine: Negative for cold intolerance, heat intolerance and polydipsia.   Genitourinary: Negative for difficulty urinating, discharge, dysuria, hematuria, penile pain, penile swelling, scrotal swelling, testicular pain and urgency.   Musculoskeletal: Positive for arthralgias, gait problem and myalgias.   Skin: Negative for rash.   Allergic/Immunologic: Negative.    Neurological: Negative for dizziness, syncope, numbness and headaches.        Tingling   Hematological: Negative for adenopathy. Does not  bruise/bleed easily.   Psychiatric/Behavioral: Negative for sleep disturbance and suicidal ideas. The patient is not nervous/anxious.         Physical Exam    /80   Pulse 60   Wt 98.8 kg (217 lb 12.8 oz)   SpO2 98%   BMI 30.81 kg/m² Body mass index is 30.81 kg/m².  Physical Exam   Constitutional: He is oriented to person, place, and time. He appears well-developed. No distress.   HENT:   Head: Normocephalic.   Right Ear: External ear normal.   Left Ear: External ear normal.   Nose: Nose normal.   Eyes: Conjunctivae are normal. Right eye exhibits no discharge. Left eye exhibits no discharge. No scleral icterus.   Neck: Neck supple. No JVD present. No tracheal deviation present. No thyromegaly present.   Cardiovascular: Normal rate, regular rhythm, normal heart sounds and intact distal pulses.   No murmur heard.  Pulmonary/Chest: Effort normal and breath sounds normal. No respiratory distress. He has no wheezes.   Abdominal: Soft. Bowel sounds are normal. There is no tenderness.   Musculoskeletal: He exhibits no edema or tenderness.   Neurological: He is alert and oriented to person, place, and time.   Skin: Skin is warm and dry. No rash noted. He is not diaphoretic. No erythema.   Psychiatric: He has a normal mood and affect. His behavior is normal. Judgment and thought content normal.       Labs and Imaging   Lab Results   Component Value Date    HGBA1C 6.8 02/19/2020    HGBA1C 6.5 08/19/2019    HGBA1C 7 01/22/2019     Office Visit on 02/19/2020   Component Date Value Ref Range Status   • Glucose 02/19/2020 73  70 - 130 mg/dL Final   • Hemoglobin A1C 02/19/2020 6.8  % Final     No images are attached to the encounter or orders placed in the encounter.  No Images in the past 120 days found..    Assessment / Plan   Neno was seen today for follow-up.    Diagnoses and all orders for this visit:    Type 2 diabetes mellitus without complication, with long-term current use of insulin (CMS/[facility])  -     POC Glucose  Fingerstick  -     POC Glycosylated Hemoglobin (Hb A1C)  -     Ambulatory Referral to Diabetic Education        Diabetes Mellitus 2 is under good control on insulin pump.  -A1c 6.8  -no hypoglycemia  -reviewed insulin pump download- see above  -again discussed need to change infusion set every 3 days  -again discussed add actual bs readings to pump so I can evaluate bs readings, not just the a1c  -will change target bs from 120 to 150 to avoid hypoglycemia  -increase carb ratio slightly  -asked him to enter correct carbs- will have him see CDE today for review of carb counting  -continue metformin 1000mg BID  -f/u 3 months  -I have asked him to call if he is experiencing hypoglycemia  -refills from pcp, pt prefers this way  -will obtain labs from pcp    1.  Diet: 3-4 carb servings per meal for females, 4-5 carb servings per meal for males  Spread carb intake throughout the day  Increase lean protein and vegetable intake  Avoid sugary drinks and processed carbs including crackers, cookies, cakes  2.  Exercise: Recommend at least 30 minutes of exercise daily, at least 5 days per week. Increase exercise gradually.   3.  Blood Glucose Goal: Blood glucose goal <150 fasting, <180 2 hr postprandial  4.  Microalbumin due 4/2019  5.  Education performed during this visit: long term diabetic complications discussed. , annual eye examinations at Ophthalmology discussed, dental hygiene discussed  and foot care reviewed., home glucose monitoring emphasized, all medications, side effects and compliance discussed carefully and Hypoglycemia management and prevention reviewed. Reviewed ‘ABCs’ of diabetes management (respective goals in parentheses):  A1C (<7), blood pressure (<130/80), and cholesterol (LDL <100, if CVD <70).    There are no Patient Instructions on file for this visit.    Follow up: Return in about 3 months (around 5/19/2020).     Discussed the nature of the disease including, risks, complications, implications,  management, safe and proper use of medications. Encouraged therapeutic lifestyle changes including low calorie diet with plenty of fruits and vegetables, daily exercise, medication compliance, and keeping scheduled follow up appointments with me and any other providers. Encouraged patient to have appointment for complete physical, fasting labs, appropriate screenings, and immunizations on an annual basis.      Trevor Jnag PA-C    Addendum 2/26/19- reviewed labs from pcp. Calcium was slightly elevated at 106 12/2019 and 10.4 9/2019, GFR 61. Plan to repeat and add vit d and pth at his f/u

## 2020-05-21 ENCOUNTER — TELEPHONE (OUTPATIENT)
Dept: ENDOCRINOLOGY | Facility: CLINIC | Age: 70
End: 2020-05-21

## 2021-02-17 ENCOUNTER — OFFICE VISIT (OUTPATIENT)
Dept: ENDOCRINOLOGY | Facility: CLINIC | Age: 71
End: 2021-02-17

## 2021-02-17 VITALS
WEIGHT: 216.2 LBS | TEMPERATURE: 97.3 F | BODY MASS INDEX: 30.58 KG/M2 | DIASTOLIC BLOOD PRESSURE: 80 MMHG | HEART RATE: 69 BPM | SYSTOLIC BLOOD PRESSURE: 120 MMHG | OXYGEN SATURATION: 98 %

## 2021-02-17 DIAGNOSIS — E83.52 HYPERCALCEMIA: ICD-10-CM

## 2021-02-17 DIAGNOSIS — Z46.81 INSULIN PUMP TITRATION: ICD-10-CM

## 2021-02-17 DIAGNOSIS — Z79.4 TYPE 2 DIABETES MELLITUS WITHOUT COMPLICATION, WITH LONG-TERM CURRENT USE OF INSULIN (HCC): Primary | ICD-10-CM

## 2021-02-17 DIAGNOSIS — E11.9 TYPE 2 DIABETES MELLITUS WITHOUT COMPLICATION, WITH LONG-TERM CURRENT USE OF INSULIN (HCC): Primary | ICD-10-CM

## 2021-02-17 LAB
GLUCOSE BLDC GLUCOMTR-MCNC: 195 MG/DL (ref 70–130)
HBA1C MFR BLD: 6.6 %

## 2021-02-17 PROCEDURE — 82947 ASSAY GLUCOSE BLOOD QUANT: CPT | Performed by: PHYSICIAN ASSISTANT

## 2021-02-17 PROCEDURE — 99214 OFFICE O/P EST MOD 30 MIN: CPT | Performed by: PHYSICIAN ASSISTANT

## 2021-02-17 PROCEDURE — 83036 HEMOGLOBIN GLYCOSYLATED A1C: CPT | Performed by: PHYSICIAN ASSISTANT

## 2021-02-17 NOTE — PROGRESS NOTES
"Chief Complaint  F/u for Diabetes Mellitus.    HPI   Neno Green is a 71 y.o. male with afib on multaq and xarelto, who is here today for f/u of Diabetes Mellitus type 2. The initial diagnosis of diabetes was made at age 48.     Pt is doing well.   Calcium slightly high on labs last year by PCP.     A1c- 6.5 (8/19/19), 7 (1/22/19), 6.2 (9/21/18), 6.8 (7/23/18), 6.4 (3/22/18)    Diabetic complications: none  Eye exam current (within one year):   Foot care and dental care: discussed.     Current diabetic medications include:  Novolog, on insulin pump  Metformin 1000mg BID  Statin: pravastatin 40, tricor    Past medications: po meds    Diabetic Monitoring  -   Medtronic pump downloaded and reviewed- he puts \"100\" for blood sugar every time he puts carbs in, he is adding carbs about twice a day but admits it is sometimes hours after eating, carb counts are mostly accurate, he reports occasional hypoglycemia if he enters a high BG     Nutrition:     Current diet: in general, a \"healthy\" diet  , on average, 2-3 meals per day plus snacks. Drinks diet soda.   Current exercise: none  Seen RD in past: yes, years ago    The following portions of the patient's history were reviewed and updated by me as appropriate: allergies, current medications, past family history, past social history, past surgical history and problem list.      Past Medical History:   Diagnosis Date   • Acid reflux    • Arthritis    • Asthma    • Depression    • Heart attack (CMS/HCC)    • Heart disease    • Hyperlipidemia    • Hypertension    • Migraine    • Type 2 diabetes mellitus (CMS/[facility])        Medications    Current Outpatient Medications:   •  Blood Glucose Monitoring Suppl (TRUE METRIX AIR GLUCOSE METER) w/Device kit, , Disp: , Rfl:   •  carvedilol (COREG) 3.125 MG tablet, , Disp: , Rfl:   •  fenofibrate (TRICOR) 145 MG tablet, , Disp: , Rfl:   •  fluticasone (FLONASE) 50 MCG/ACT nasal spray, SHAKE LQ AND U 2 SPRAYS IEN D, Disp: , Rfl: 3  •  " isosorbide mononitrate (IMDUR) 30 MG 24 hr tablet, , Disp: , Rfl:   •  losartan (COZAAR) 50 MG tablet, , Disp: , Rfl:   •  metFORMIN (GLUCOPHAGE) 1000 MG tablet, 1,000 mg 2 (Two) Times a Day With Meals., Disp: , Rfl:   •  MULTAQ 400 MG tablet, , Disp: , Rfl:   •  nitroglycerin (NITROSTAT) 0.4 MG SL tablet, , Disp: , Rfl:   •  NOVOLOG 100 UNIT/ML injection, , Disp: , Rfl:   •  omeprazole (priLOSEC) 40 MG capsule, , Disp: , Rfl:   •  pravastatin (PRAVACHOL) 40 MG tablet, , Disp: , Rfl:   •  TRUE METRIX BLOOD GLUCOSE TEST test strip, , Disp: , Rfl:   •  TRUEPLUS LANCETS 33G misc, , Disp: , Rfl:   •  XARELTO 20 MG tablet, Take  by mouth Daily., Disp: , Rfl: 2    Review of Systems  Review of Systems   Constitutional: Negative for chills, fatigue, fever and unexpected weight change.   HENT: Negative for ear discharge, ear pain, hearing loss, nosebleeds, rhinorrhea and sore throat.    Eyes: Negative for pain, redness and visual disturbance.   Respiratory: Negative for cough and wheezing.    Cardiovascular: Negative for chest pain, palpitations and leg swelling.        Leg cramps   Gastrointestinal: Negative for abdominal pain, blood in stool, constipation, diarrhea, nausea and vomiting.   Endocrine: Negative for cold intolerance, heat intolerance and polydipsia.   Genitourinary: Negative for difficulty urinating, discharge, dysuria, hematuria, penile pain, penile swelling, scrotal swelling, testicular pain and urgency.   Musculoskeletal: Positive for arthralgias, gait problem and myalgias.   Skin: Negative for rash.   Allergic/Immunologic: Negative.    Neurological: Negative for dizziness, syncope, numbness and headaches.        Tingling   Hematological: Negative for adenopathy. Does not bruise/bleed easily.   Psychiatric/Behavioral: Negative for sleep disturbance and suicidal ideas. The patient is not nervous/anxious.         Physical Exam    /80   Pulse 69   Temp 97.3 °F (36.3 °C)   Wt 98.1 kg (216 lb 3.2 oz)    SpO2 98%   BMI 30.58 kg/m² Body mass index is 30.58 kg/m².  Physical Exam   Constitutional: He is oriented to person, place, and time. He appears well-developed. No distress.   HENT:   Head: Normocephalic.   Right Ear: External ear normal.   Left Ear: External ear normal.   Nose: Nose normal.   Eyes: Conjunctivae are normal. Right eye exhibits no discharge. Left eye exhibits no discharge. No scleral icterus.   Neck: Neck supple. No JVD present. No tracheal deviation present. No thyromegaly present.   Cardiovascular: Normal rate, regular rhythm and normal heart sounds.   No murmur heard.  Pulmonary/Chest: Effort normal and breath sounds normal. No respiratory distress. He has no wheezes.   Abdominal: Soft. Bowel sounds are normal. There is no abdominal tenderness.   Musculoskeletal: No tenderness.    Neno had a diabetic foot exam performed today.   During the foot exam he had a monofilament test performed (decreased but not absent sensation 5/5 bilaterally).  Vascular Status -  His right foot exhibits normal foot vasculature  and no edema. His left foot exhibits normal foot vasculature  and no edema.  Skin Integrity  -  His right foot skin is intact.  He has no right foot onychomycosis, no right foot ulcer and non-callous right foot.His left foot skin is intact. He has no left foot onychomycosis, no left foot ulcer and non-callous left foot..  Neurological: He is alert and oriented to person, place, and time.   Skin: Skin is warm and dry. No rash noted. He is not diaphoretic. No erythema.   Psychiatric: His behavior is normal. Judgment and thought content normal.       Labs and Imaging   Lab Results   Component Value Date    HGBA1C 6.6 02/17/2021    HGBA1C 6.8 02/19/2020    HGBA1C 6.5 08/19/2019     Office Visit on 02/17/2021   Component Date Value Ref Range Status   • Hemoglobin A1C 02/17/2021 6.6  % Final   • Glucose 02/17/2021 195* 70 - 130 mg/dL Final     No images are attached to the encounter or orders placed  "in the encounter.  No Images in the past 120 days found..    Assessment / Plan   Diagnoses and all orders for this visit:    1. Type 2 diabetes mellitus without complication, with long-term current use of insulin (CMS/[facility]) (Primary)  -     POC Glycosylated Hemoglobin (Hb A1C)  -     POC Glucose, Blood  -     Microalbumin / Creatinine Urine Ratio - Urine, Clean Catch  -     Comprehensive Metabolic Panel  -     Lipid Panel  -     TSH    2. Hypercalcemia  -     Comprehensive Metabolic Panel  -     Vitamin D 25 Hydroxy  -     PTH, Intact    3. Insulin pump titration        Diabetes Mellitus 2 is under good control on insulin pump.  -A1c 6.6  -he enters \"100\" for BG every time he enters cabs- we have discussed this in the past and need to check BG and enter real values. I have weakened his correction slightly again as he reports hypoglycemia when he enters real BG readings.   -need to enter carbs before eating and if it has been >1 hour since meal to just enter BG for correction instead of carbs  -continue metformin 1000mg BID  -f/u 3 months  -he does not need refills      1.  Diet: 3-4 carb servings per meal for females, 4-5 carb servings per meal for males  Spread carb intake throughout the day  Increase lean protein and vegetable intake  Avoid sugary drinks and processed carbs including crackers, cookies, cakes  2.  Exercise: Recommend at least 30 minutes of exercise daily, at least 5 days per week. Increase exercise gradually.   3.  Blood Glucose Goal: Blood glucose goal <150 fasting, <180 2 hr postprandial  4.  Microalbumin due 4/2019  5.  Education performed during this visit: long term diabetic complications discussed. , annual eye examinations at Ophthalmology discussed, dental hygiene discussed  and foot care reviewed., home glucose monitoring emphasized, all medications, side effects and compliance discussed carefully and Hypoglycemia management and prevention reviewed. Reviewed ‘ABCs’ of diabetes management " (respective goals in parentheses):  A1C (<7), blood pressure (<130/80), and cholesterol (LDL <100, if CVD <70).    There are no Patient Instructions on file for this visit.    Follow up: Return in about 3 months (around 5/17/2021).     Discussed the nature of the disease including, risks, complications, implications, management, safe and proper use of medications. Encouraged therapeutic lifestyle changes including low calorie diet with plenty of fruits and vegetables, daily exercise, medication compliance, and keeping scheduled follow up appointments with me and any other providers. Encouraged patient to have appointment for complete physical, fasting labs, appropriate screenings, and immunizations on an annual basis.      Trevor Jang PA-C

## 2021-02-18 LAB
25(OH)D3+25(OH)D2 SERPL-MCNC: 41.3 NG/ML (ref 30–100)
ALBUMIN SERPL-MCNC: 4.5 G/DL (ref 3.7–4.7)
ALBUMIN/CREAT UR: <7 MG/G CREAT (ref 0–29)
ALBUMIN/GLOB SERPL: 1.7 {RATIO} (ref 1.2–2.2)
ALP SERPL-CCNC: 29 IU/L (ref 39–117)
ALT SERPL-CCNC: 15 IU/L (ref 0–44)
AST SERPL-CCNC: 25 IU/L (ref 0–40)
BILIRUB SERPL-MCNC: 0.6 MG/DL (ref 0–1.2)
BUN SERPL-MCNC: 20 MG/DL (ref 8–27)
BUN/CREAT SERPL: 14 (ref 10–24)
CALCIUM SERPL-MCNC: 9.7 MG/DL (ref 8.6–10.2)
CHLORIDE SERPL-SCNC: 101 MMOL/L (ref 96–106)
CHOLEST SERPL-MCNC: 134 MG/DL (ref 100–199)
CO2 SERPL-SCNC: 22 MMOL/L (ref 20–29)
CREAT SERPL-MCNC: 1.43 MG/DL (ref 0.76–1.27)
CREAT UR-MCNC: 42.7 MG/DL
GLOBULIN SER CALC-MCNC: 2.6 G/DL (ref 1.5–4.5)
GLUCOSE SERPL-MCNC: 170 MG/DL (ref 65–99)
HDLC SERPL-MCNC: 37 MG/DL
LDLC SERPL CALC-MCNC: 72 MG/DL (ref 0–99)
MICROALBUMIN UR-MCNC: <3 UG/ML
POTASSIUM SERPL-SCNC: 5.1 MMOL/L (ref 3.5–5.2)
PROT SERPL-MCNC: 7.1 G/DL (ref 6–8.5)
PTH-INTACT SERPL-MCNC: 25 PG/ML (ref 15–65)
SODIUM SERPL-SCNC: 136 MMOL/L (ref 134–144)
TRIGL SERPL-MCNC: 139 MG/DL (ref 0–149)
TSH SERPL DL<=0.005 MIU/L-ACNC: 1.98 UIU/ML (ref 0.45–4.5)
VLDLC SERPL CALC-MCNC: 25 MG/DL (ref 5–40)

## 2021-05-18 ENCOUNTER — OFFICE VISIT (OUTPATIENT)
Dept: ENDOCRINOLOGY | Facility: CLINIC | Age: 71
End: 2021-05-18

## 2021-05-18 VITALS
HEIGHT: 70 IN | BODY MASS INDEX: 30.43 KG/M2 | DIASTOLIC BLOOD PRESSURE: 80 MMHG | HEART RATE: 73 BPM | SYSTOLIC BLOOD PRESSURE: 128 MMHG | WEIGHT: 212.6 LBS | OXYGEN SATURATION: 95 % | TEMPERATURE: 98.4 F

## 2021-05-18 DIAGNOSIS — E78.2 MIXED HYPERLIPIDEMIA: Chronic | ICD-10-CM

## 2021-05-18 DIAGNOSIS — E11.9 TYPE 2 DIABETES MELLITUS WITHOUT COMPLICATION, WITH LONG-TERM CURRENT USE OF INSULIN (HCC): Primary | Chronic | ICD-10-CM

## 2021-05-18 DIAGNOSIS — Z79.4 TYPE 2 DIABETES MELLITUS WITHOUT COMPLICATION, WITH LONG-TERM CURRENT USE OF INSULIN (HCC): Primary | Chronic | ICD-10-CM

## 2021-05-18 LAB
GLUCOSE BLDC GLUCOMTR-MCNC: 219 MG/DL (ref 70–130)
HBA1C MFR BLD: 6.6 %

## 2021-05-18 PROCEDURE — 83036 HEMOGLOBIN GLYCOSYLATED A1C: CPT | Performed by: PHYSICIAN ASSISTANT

## 2021-05-18 PROCEDURE — 82947 ASSAY GLUCOSE BLOOD QUANT: CPT | Performed by: PHYSICIAN ASSISTANT

## 2021-05-18 PROCEDURE — 99214 OFFICE O/P EST MOD 30 MIN: CPT | Performed by: PHYSICIAN ASSISTANT

## 2021-05-18 PROCEDURE — 3044F HG A1C LEVEL LT 7.0%: CPT | Performed by: PHYSICIAN ASSISTANT

## 2021-05-18 NOTE — PROGRESS NOTES
Chief Complaint  F/u for Diabetes Mellitus.    HPI   Neno Green is a 71 y.o. male with afib on multaq and xarelto, who is here today for f/u of Diabetes Mellitus type 2. The initial diagnosis of diabetes was made at age 48.     Pt is doing well.     A1c- 6.6 (5/15/2021)< 6.5 (8/19/19), 7 (1/22/19), 6.2 (9/21/18), 6.8 (7/23/18), 6.4 (3/22/18)    Diabetic complications: none  Eye exam current (within one year): franck Matthews ([facility])  Foot care and dental care: discussed.     Current diabetic medications include:  Novolog, on insulin pump  Metformin 1000mg BID    Statin: pravastatin 40, tricor    Past medications: po meds    Diabetic Monitoring  -   Medtronic pump downloaded and reviewed- checks BG 1-4 times per day, BG readings range mostly from 100-150, no hypoglycemia per pump download or patient report, 72% basal, 28% bolus    The following portions of the patient's history were reviewed and updated by me as appropriate: allergies, current medications, past family history, past social history, past surgical history and problem list.      Past Medical History:   Diagnosis Date   • Acid reflux    • Arthritis    • Asthma    • Depression    • Heart attack (CMS/[facility])    • Heart disease    • Hyperlipidemia    • Hypertension    • Migraine    • Type 2 diabetes mellitus (CMS/[facility])        Medications    Current Outpatient Medications:   •  Blood Glucose Monitoring Suppl (TRUE METRIX AIR GLUCOSE METER) w/Device kit, , Disp: , Rfl:   •  carvedilol (COREG) 3.125 MG tablet, , Disp: , Rfl:   •  fenofibrate (TRICOR) 145 MG tablet, , Disp: , Rfl:   •  fluticasone (FLONASE) 50 MCG/ACT nasal spray, SHAKE LQ AND U 2 SPRAYS IEN D, Disp: , Rfl: 3  •  isosorbide mononitrate (IMDUR) 30 MG 24 hr tablet, , Disp: , Rfl:   •  losartan (COZAAR) 50 MG tablet, , Disp: , Rfl:   •  metFORMIN (GLUCOPHAGE) 1000 MG tablet, 1,000 mg 2 (Two) Times a Day With Meals., Disp: , Rfl:   •  MULTAQ 400 MG tablet, , Disp: , Rfl:   •  nitroglycerin  "(NITROSTAT) 0.4 MG SL tablet, , Disp: , Rfl:   •  NOVOLOG 100 UNIT/ML injection, , Disp: , Rfl:   •  omeprazole (priLOSEC) 40 MG capsule, , Disp: , Rfl:   •  pravastatin (PRAVACHOL) 40 MG tablet, , Disp: , Rfl:   •  TRUE METRIX BLOOD GLUCOSE TEST test strip, , Disp: , Rfl:   •  TRUEPLUS LANCETS 33G misc, , Disp: , Rfl:   •  XARELTO 20 MG tablet, Take  by mouth Daily., Disp: , Rfl: 2    Review of Systems  Review of Systems   Constitutional: Negative for chills, fatigue, fever and unexpected weight change.   HENT: Negative for ear discharge, ear pain, hearing loss, nosebleeds, rhinorrhea and sore throat.    Eyes: Negative for pain, redness and visual disturbance.   Respiratory: Negative for cough and wheezing.    Cardiovascular: Negative for chest pain, palpitations and leg swelling.        Leg cramps   Gastrointestinal: Negative for abdominal pain, blood in stool, constipation, diarrhea, nausea and vomiting.   Endocrine: Negative for cold intolerance, heat intolerance and polydipsia.   Genitourinary: Negative for difficulty urinating, discharge, dysuria, hematuria, penile pain, penile swelling, scrotal swelling, testicular pain and urgency.   Musculoskeletal: Positive for arthralgias, gait problem and myalgias.   Skin: Negative for rash.   Allergic/Immunologic: Negative.    Neurological: Negative for dizziness, syncope, numbness and headaches.        Tingling   Hematological: Negative for adenopathy. Does not bruise/bleed easily.   Psychiatric/Behavioral: Negative for sleep disturbance and suicidal ideas. The patient is not nervous/anxious.         Physical Exam    /80   Pulse 73   Temp 98.4 °F (36.9 °C)   Ht 177.8 cm (70\")   Wt 96.4 kg (212 lb 9.6 oz)   SpO2 95%   BMI 30.50 kg/m² Body mass index is 30.5 kg/m².  Physical Exam   Constitutional: He is oriented to person, place, and time. He appears well-developed. No distress.   HENT:   Head: Normocephalic.   Right Ear: External ear normal.   Left Ear: " External ear normal.   Nose: Nose normal.   Eyes: Conjunctivae are normal. Right eye exhibits no discharge. Left eye exhibits no discharge. No scleral icterus.   Neck: No JVD present. No tracheal deviation present. No thyromegaly present.   Cardiovascular: Normal rate, regular rhythm and normal heart sounds.   No murmur heard.  Pulmonary/Chest: Effort normal and breath sounds normal. No respiratory distress. He has no wheezes.   Abdominal: Soft. Bowel sounds are normal. There is no abdominal tenderness.   Musculoskeletal: No tenderness.   Neurological: He is alert and oriented to person, place, and time.   Skin: Skin is warm and dry. No rash noted. He is not diaphoretic. No erythema.   Psychiatric: His behavior is normal. Judgment and thought content normal.       Labs and Imaging   Lab Results   Component Value Date    HGBA1C 6.6 05/18/2021    HGBA1C 6.6 02/17/2021    HGBA1C 6.8 02/19/2020     Office Visit on 05/18/2021   Component Date Value Ref Range Status   • Glucose 05/18/2021 219* 70 - 130 mg/dL Final   • Hemoglobin A1C 05/18/2021 6.6  % Final     No images are attached to the encounter or orders placed in the encounter.  No Images in the past 120 days found..    Assessment / Plan   Diagnoses and all orders for this visit:    1. Type 2 diabetes mellitus without complication, with long-term current use of insulin (CMS/[facility]) (Primary)  -     POC Glucose, Blood  -     POC Glycosylated Hemoglobin (Hb A1C)    2. Mixed hyperlipidemia        Diabetes Mellitus 2 is under good control on insulin pump.  -A1c 6.6  -Medtronic pump downloaded and reviewed- checks BG 1-4 times per day, BG readings range mostly from 100-150, no hypoglycemia per pump download or patient report, 72% basal, 28% bolus  -no pump setting changes  -continue metformin 1000mg BID  -foot exam and labs updated 2/2021, eye exam due  -metformin and novolog are refilled by pcp      1.  Diet: 3-4 carb servings per meal for females, 4-5 carb servings per  meal for males  Spread carb intake throughout the day  Increase lean protein and vegetable intake  Avoid sugary drinks and processed carbs including crackers, cookies, cakes  2.  Exercise: Recommend at least 30 minutes of exercise daily, at least 5 days per week. Increase exercise gradually.   3.  Blood Glucose Goal: Blood glucose goal <150 fasting, <180 2 hr postprandial  4.  Microalbumin due 2/2021  5.  Education performed during this visit: long term diabetic complications discussed. , annual eye examinations at Ophthalmology discussed, dental hygiene discussed  and foot care reviewed., home glucose monitoring emphasized, all medications, side effects and compliance discussed carefully and Hypoglycemia management and prevention reviewed. Reviewed ‘ABCs’ of diabetes management (respective goals in parentheses):  A1C (<7), blood pressure (<130/80), and cholesterol (LDL <100, if CVD <70).    Hyperlipidemia  -lipid panel utd 2/2021  -continue pravastatin 40 mg qhs    There are no Patient Instructions on file for this visit.    Follow up: Return in about 4 months (around 9/18/2021).     Discussed the nature of the disease including, risks, complications, implications, management, safe and proper use of medications. Encouraged therapeutic lifestyle changes including low calorie diet with plenty of fruits and vegetables, daily exercise, medication compliance, and keeping scheduled follow up appointments with me and any other providers. Encouraged patient to have appointment for complete physical, fasting labs, appropriate screenings, and immunizations on an annual basis.      Trevor Jang PA-C

## 2021-09-22 ENCOUNTER — OFFICE VISIT (OUTPATIENT)
Dept: ENDOCRINOLOGY | Facility: CLINIC | Age: 71
End: 2021-09-22

## 2021-09-22 VITALS
DIASTOLIC BLOOD PRESSURE: 80 MMHG | HEIGHT: 70 IN | SYSTOLIC BLOOD PRESSURE: 122 MMHG | BODY MASS INDEX: 30.72 KG/M2 | OXYGEN SATURATION: 94 % | WEIGHT: 214.6 LBS | HEART RATE: 66 BPM

## 2021-09-22 DIAGNOSIS — Z79.4 TYPE 2 DIABETES MELLITUS WITHOUT COMPLICATION, WITH LONG-TERM CURRENT USE OF INSULIN (HCC): Primary | Chronic | ICD-10-CM

## 2021-09-22 DIAGNOSIS — E78.2 MIXED HYPERLIPIDEMIA: Chronic | ICD-10-CM

## 2021-09-22 DIAGNOSIS — E11.9 TYPE 2 DIABETES MELLITUS WITHOUT COMPLICATION, WITH LONG-TERM CURRENT USE OF INSULIN (HCC): Primary | Chronic | ICD-10-CM

## 2021-09-22 LAB
GLUCOSE BLDC GLUCOMTR-MCNC: 212 MG/DL (ref 70–130)
HBA1C MFR BLD: 6.8 %

## 2021-09-22 PROCEDURE — 83036 HEMOGLOBIN GLYCOSYLATED A1C: CPT | Performed by: PHYSICIAN ASSISTANT

## 2021-09-22 PROCEDURE — 3044F HG A1C LEVEL LT 7.0%: CPT | Performed by: PHYSICIAN ASSISTANT

## 2021-09-22 PROCEDURE — 82947 ASSAY GLUCOSE BLOOD QUANT: CPT | Performed by: PHYSICIAN ASSISTANT

## 2021-09-22 PROCEDURE — 99214 OFFICE O/P EST MOD 30 MIN: CPT | Performed by: PHYSICIAN ASSISTANT

## 2021-09-22 RX ORDER — CARVEDILOL 6.25 MG/1
6.25 TABLET ORAL 2 TIMES DAILY WITH MEALS
COMMUNITY
Start: 2021-08-16

## 2021-09-22 NOTE — PROGRESS NOTES
Chief Complaint  F/u for Diabetes Mellitus.    HPI   Neno Green is a 71 y.o. male with afib on multaq and xarelto, who is here today for f/u of Diabetes Mellitus type 2. The initial diagnosis of diabetes was made at age 48.     Pt is doing well.   Had labs with PCP 8/2021.    A1c- 6.8 (9/22/2021), 6.6 (5/15/2021), 6.5 (8/19/19), 7 (1/22/19), 6.2 (9/21/18), 6.8 (7/23/18), 6.4 (3/22/18)    Diabetic complications: none  Eye exam current (within one year): franck Matthews ([place] [facility])  Foot care and dental care: discussed.     Current diabetic medications include:  Novolog, on insulin pump  Metformin 1000mg BID    Statin: pravastatin 40, tricor    Past medications: po meds    Diabetic Monitoring  -   Medtronic pump downloaded and reviewed- checks BG 1-3 times per day, enters carbs 1-3 times per day, BG readings range mostly from 120-180, no hypoglycemia per pump download or patient report, 68% basal, 32% bolus    The following portions of the patient's history were reviewed and updated by me as appropriate: allergies, current medications, past family history, past social history, past surgical history and problem list.        Past Medical History:   Diagnosis Date   • Acid reflux    • Arthritis    • Asthma    • Depression    • Heart attack (CMS/HCC)    • Heart disease    • Hyperlipidemia    • Hypertension    • Migraine    • Type 2 diabetes mellitus (CMS/[facility])        Medications    Current Outpatient Medications:   •  Blood Glucose Monitoring Suppl (TRUE METRIX AIR GLUCOSE METER) w/Device kit, , Disp: , Rfl:   •  carvedilol (COREG) 6.25 MG tablet, , Disp: , Rfl:   •  fenofibrate (TRICOR) 145 MG tablet, , Disp: , Rfl:   •  fluticasone (FLONASE) 50 MCG/ACT nasal spray, SHAKE LQ AND U 2 SPRAYS IEN D, Disp: , Rfl: 3  •  isosorbide mononitrate (IMDUR) 30 MG 24 hr tablet, , Disp: , Rfl:   •  losartan (COZAAR) 50 MG tablet, , Disp: , Rfl:   •  metFORMIN (GLUCOPHAGE) 1000 MG tablet, 1,000 mg 2 (Two) Times a Day With  "Meals., Disp: , Rfl:   •  MULTAQ 400 MG tablet, , Disp: , Rfl:   •  nitroglycerin (NITROSTAT) 0.4 MG SL tablet, , Disp: , Rfl:   •  NOVOLOG 100 UNIT/ML injection, , Disp: , Rfl:   •  omeprazole (priLOSEC) 40 MG capsule, , Disp: , Rfl:   •  pravastatin (PRAVACHOL) 40 MG tablet, , Disp: , Rfl:   •  TRUE METRIX BLOOD GLUCOSE TEST test strip, , Disp: , Rfl:   •  TRUEPLUS LANCETS 33G misc, , Disp: , Rfl:   •  XARELTO 20 MG tablet, Take  by mouth Daily., Disp: , Rfl: 2  •  insulin lispro (HumaLOG) 100 UNIT/ML injection, Use up to 50 units daily via insulin pump, Disp: 60 mL, Rfl: 1    Review of Systems  Review of Systems   All other systems reviewed and are negative.       Physical Exam    /80   Pulse 66   Ht 177.8 cm (70\")   Wt 97.3 kg (214 lb 9.6 oz)   SpO2 94%   BMI 30.79 kg/m² Body mass index is 30.79 kg/m².  Physical Exam   Constitutional: He is oriented to person, place, and time. He appears well-developed. No distress.   HENT:   Head: Normocephalic.   Right Ear: External ear normal.   Left Ear: External ear normal.   Nose: Nose normal.   Eyes: Conjunctivae are normal. Right eye exhibits no discharge. Left eye exhibits no discharge. No scleral icterus.   Neck: No JVD present. No tracheal deviation present. No thyromegaly present.   Cardiovascular: Normal rate, regular rhythm and normal heart sounds.   No murmur heard.  Pulmonary/Chest: Effort normal and breath sounds normal. No respiratory distress. He has no wheezes.   Abdominal: Soft. Bowel sounds are normal. There is no abdominal tenderness.   Musculoskeletal: No tenderness.   Neurological: He is alert and oriented to person, place, and time.   Skin: Skin is warm and dry. No rash noted. He is not diaphoretic. No erythema.   Psychiatric: His behavior is normal. Judgment and thought content normal.       Labs and Imaging   Lab Results   Component Value Date    HGBA1C 6.8 09/22/2021    HGBA1C 6.6 05/18/2021    HGBA1C 6.6 02/17/2021     Office Visit on " 09/22/2021   Component Date Value Ref Range Status   • Glucose 09/22/2021 212* 70 - 130 mg/dL Final   • Hemoglobin A1C 09/22/2021 6.8  % Final     Lipid Panel (02/17/2021 15:30)  Comprehensive Metabolic Panel (02/17/2021 15:30)  Microalbumin / Creatinine Urine Ratio - Urine, Clean Catch (02/17/2021 15:30)  TSH (02/17/2021 15:30)  Vitamin D 25 Hydroxy (02/17/2021 15:30)  PTH, Intact (02/17/2021 15:30)      Assessment / Plan   Diagnoses and all orders for this visit:    1. Type 2 diabetes mellitus without complication, with long-term current use of insulin (CMS/[facility]) (Primary)  -     POC Glucose, Blood  -     POC Glycosylated Hemoglobin (Hb A1C)  -     insulin lispro (HumaLOG) 100 UNIT/ML injection; Use up to 50 units daily via insulin pump  Dispense: 60 mL; Refill: 1    2. Mixed hyperlipidemia        Diabetes Mellitus 2 is under good control on insulin pump.  -A1c 6.8  -Medtronic pump downloaded and reviewed- checks BG 1-3 times per day, enters carbs 1-3 times per day, BG readings range mostly from 120-180, no hypoglycemia per pump download or patient report, 68% basal, 32% bolus  -no pump setting changes  -continue metformin 1000mg BID  -foot exam and labs updated 2/2021, eye exam due and should be done yearly  -metformin and novolog are refilled by pcp      1.  Diet: 3-4 carb servings per meal for females, 4-5 carb servings per meal for males  Spread carb intake throughout the day  Increase lean protein and vegetable intake  Avoid sugary drinks and processed carbs including crackers, cookies, cakes  2.  Exercise: Recommend at least 30 minutes of exercise daily, at least 5 days per week. Increase exercise gradually.   3.  Blood Glucose Goal: Blood glucose goal <150 fasting, <180 2 hr postprandial  4.  Microalbumin due 2/2021  5.  Education performed during this visit: long term diabetic complications discussed. , annual eye examinations at Ophthalmology discussed, dental hygiene discussed  and foot care reviewed.,  home glucose monitoring emphasized, all medications, side effects and compliance discussed carefully and Hypoglycemia management and prevention reviewed. Reviewed ‘ABCs’ of diabetes management (respective goals in parentheses):  A1C (<7), blood pressure (<130/80), and cholesterol (LDL <100, if CVD <70).    Hyperlipidemia  -lipid panel utd 2/2021 - will obtain more recent labs from pcp (from august)  -continue pravastatin 40 mg qhs    There are no Patient Instructions on file for this visit.    Follow up: Return in about 4 months (around 1/22/2022).     Discussed the nature of the disease including, risks, complications, implications, management, safe and proper use of medications. Encouraged therapeutic lifestyle changes including low calorie diet with plenty of fruits and vegetables, daily exercise, medication compliance, and keeping scheduled follow up appointments with me and any other providers. Encouraged patient to have appointment for complete physical, fasting labs, appropriate screenings, and immunizations on an annual basis.      Trevor Jang PA-C

## 2022-01-12 ENCOUNTER — OFFICE VISIT (OUTPATIENT)
Dept: ENDOCRINOLOGY | Facility: CLINIC | Age: 72
End: 2022-01-12

## 2022-01-12 VITALS
OXYGEN SATURATION: 95 % | DIASTOLIC BLOOD PRESSURE: 80 MMHG | WEIGHT: 219.2 LBS | HEIGHT: 70 IN | HEART RATE: 58 BPM | SYSTOLIC BLOOD PRESSURE: 120 MMHG | BODY MASS INDEX: 31.38 KG/M2

## 2022-01-12 DIAGNOSIS — E78.2 MIXED HYPERLIPIDEMIA: Chronic | ICD-10-CM

## 2022-01-12 DIAGNOSIS — Z46.81 INSULIN PUMP TITRATION: ICD-10-CM

## 2022-01-12 DIAGNOSIS — Z79.4 TYPE 2 DIABETES MELLITUS WITHOUT COMPLICATION, WITH LONG-TERM CURRENT USE OF INSULIN: Primary | Chronic | ICD-10-CM

## 2022-01-12 DIAGNOSIS — E11.9 TYPE 2 DIABETES MELLITUS WITHOUT COMPLICATION, WITH LONG-TERM CURRENT USE OF INSULIN: Primary | Chronic | ICD-10-CM

## 2022-01-12 LAB
EXPIRATION DATE: NORMAL
EXPIRATION DATE: NORMAL
GLUCOSE BLDC GLUCOMTR-MCNC: 101 MG/DL (ref 70–130)
HBA1C MFR BLD: 7.5 %
Lab: NORMAL
Lab: NORMAL

## 2022-01-12 PROCEDURE — 83036 HEMOGLOBIN GLYCOSYLATED A1C: CPT | Performed by: PHYSICIAN ASSISTANT

## 2022-01-12 PROCEDURE — 3051F HG A1C>EQUAL 7.0%<8.0%: CPT | Performed by: PHYSICIAN ASSISTANT

## 2022-01-12 PROCEDURE — 82947 ASSAY GLUCOSE BLOOD QUANT: CPT | Performed by: PHYSICIAN ASSISTANT

## 2022-01-12 PROCEDURE — 99214 OFFICE O/P EST MOD 30 MIN: CPT | Performed by: PHYSICIAN ASSISTANT

## 2022-01-12 NOTE — PROGRESS NOTES
Chief Complaint  F/u for Diabetes Mellitus.    HPI   Neno Green is a 72 y.o. male with afib on multaq and xarelto, who is here today for f/u of Diabetes Mellitus type 2. The initial diagnosis of diabetes was made at age 48.     Pt is doing well.   Had labs with PCP 11/2021.  Feels like he is not getting enough insulin for boluses.   Pt expresses he would feel better if A1C was <7.  He denies hypoglycemia    A1c- 7.5 (1/12/22), 6.8 (9/22/2021), 6.6 (5/15/2021), 6.5 (8/19/19), 7 (1/22/19), 6.2 (9/21/18), 6.8 (7/23/18), 6.4 (3/22/18)    Diabetic complications: none  Eye exam current (within one year): mayela Matthews ([place] [facility])  Foot care and dental care: discussed.     Current diabetic medications include:  Novolog, on insulin pump  Metformin 1000mg BID    Statin: pravastatin 40, tricor    Past medications: po meds    Diabetic Monitoring  -   Medtronic pump downloaded and reviewed- checks BG 1-3 times per day, enters carbs 1-3 times per day, BG readings range mostly from 120-160s, no hypoglycemia per pump download or patient report, 58% basal, 42% bolus    The following portions of the patient's history were reviewed and updated by me as appropriate: allergies, current medications, past family history, past social history, past surgical history and problem list.      Past Medical History:   Diagnosis Date   • Acid reflux    • Arthritis    • Asthma    • Depression    • Heart attack (HCC)    • Heart disease    • Hyperlipidemia    • Hypertension    • Migraine    • Type 2 diabetes mellitus (HCC)        Medications    Current Outpatient Medications:   •  Blood Glucose Monitoring Suppl (TRUE METRIX AIR GLUCOSE METER) w/Device kit, , Disp: , Rfl:   •  carvedilol (COREG) 6.25 MG tablet, , Disp: , Rfl:   •  fenofibrate (TRICOR) 145 MG tablet, , Disp: , Rfl:   •  fluticasone (FLONASE) 50 MCG/ACT nasal spray, SHAKE LQ AND U 2 SPRAYS IEN D, Disp: , Rfl: 3  •  isosorbide mononitrate (IMDUR) 30 MG 24 hr tablet, , Disp: ,  "Rfl:   •  losartan (COZAAR) 50 MG tablet, , Disp: , Rfl:   •  metFORMIN (GLUCOPHAGE) 1000 MG tablet, 1,000 mg 2 (Two) Times a Day With Meals., Disp: , Rfl:   •  MULTAQ 400 MG tablet, , Disp: , Rfl:   •  nitroglycerin (NITROSTAT) 0.4 MG SL tablet, , Disp: , Rfl:   •  NOVOLOG 100 UNIT/ML injection, , Disp: , Rfl:   •  omeprazole (priLOSEC) 40 MG capsule, , Disp: , Rfl:   •  pravastatin (PRAVACHOL) 40 MG tablet, , Disp: , Rfl:   •  TRUE METRIX BLOOD GLUCOSE TEST test strip, , Disp: , Rfl:   •  TRUEPLUS LANCETS 33G misc, , Disp: , Rfl:   •  XARELTO 20 MG tablet, Take  by mouth Daily., Disp: , Rfl: 2    Review of Systems  Review of Systems   All other systems reviewed and are negative.       Physical Exam    /80   Pulse 58   Ht 177.8 cm (70\")   Wt 99.4 kg (219 lb 3.2 oz)   SpO2 95%   BMI 31.45 kg/m² Body mass index is 31.45 kg/m².  Physical Exam   Constitutional: He is oriented to person, place, and time. He appears well-developed. No distress.   HENT:   Head: Normocephalic.   Right Ear: External ear normal.   Left Ear: External ear normal.   Nose: Nose normal.   Eyes: Conjunctivae are normal. Right eye exhibits no discharge. Left eye exhibits no discharge. No scleral icterus.   Neck: No JVD present. No tracheal deviation present. No thyromegaly present.   Cardiovascular: Normal rate, regular rhythm and normal heart sounds.   No murmur heard.  Pulmonary/Chest: Effort normal and breath sounds normal. No respiratory distress. He has no wheezes.   Abdominal: Soft. Bowel sounds are normal. There is no abdominal tenderness.   Musculoskeletal: No tenderness.    Neno had a diabetic foot exam performed today.   During the foot exam he had a monofilament test performed.    Neurological Sensory Findings - Altered hot/cold right ankle/foot discrimination and altered hot/cold left ankle/foot discrimination. Altered sharp/dull right ankle/foot discrimination and altered sharp/dull left ankle/foot discrimination. Right " ankle/foot altered proprioception and left ankle/foot altered proprioception.  Vascular Status -  His right foot exhibits normal foot vasculature  and no edema. His left foot exhibits normal foot vasculature  and no edema.  Skin Integrity  -  His right foot skin is intact.  He has no right foot onychomycosis, no right foot ulcer and non-callous right foot.His left foot skin is intact. He has no left foot onychomycosis, no left foot ulcer and non-callous left foot..  Neurological: He is alert and oriented to person, place, and time.   Skin: Skin is warm and dry. No rash noted. He is not diaphoretic. No erythema.   Psychiatric: His behavior is normal. Judgment and thought content normal.       Labs and Imaging   Lab Results   Component Value Date    HGBA1C 7.5 01/12/2022    HGBA1C 6.8 09/22/2021    HGBA1C 6.6 05/18/2021     Office Visit on 01/12/2022   Component Date Value Ref Range Status   • Glucose 01/12/2022 101  70 - 130 mg/dL Final   • Lot Number 01/12/2022 [id number]   Final   • Expiration Date 01/12/2022 [id number]   Final   • Hemoglobin A1C 01/12/2022 7.5  % Final   • Lot Number 01/12/2022 [id number]   Final   • Expiration Date 01/12/2022 [id number]   Final     Microalbumin / Creatinine Urine Ratio - Urine, Clean Catch (02/17/2021 15:30)    External labs from 11/22/2021 reviewed: Triglycerides 94, LDL 51, glucose 110, creatinine 1.35, GFR 52, calcium 10.1, albumin 4.3, AST 20, ALT 16, hemoglobin 13.6, hematocrit 41, A1c 7.2, TSH 2.58    Assessment / Plan   Diagnoses and all orders for this visit:    1. Type 2 diabetes mellitus without complication, with long-term current use of insulin (HCC) (Primary)  -     POC Glucose, Blood  -     POC Glycosylated Hemoglobin (Hb A1C)  -     Microalbumin / Creatinine Urine Ratio - Urine, Clean Catch; Future    2. Mixed hyperlipidemia    3. Insulin pump titration        Diabetes Mellitus 2 is under good control on insulin pump.  -A1c 7.5, up from 6.8 last visit  -[employer]  pump downloaded and reviewed- checks BG 1-3 times per day, enters carbs 1-3 times per day, BG readings range mostly from 120-160s, no hypoglycemia per pump download or patient report, 58% basal, 42% bolus  -strengthen carb ratio slightly per pt request   -continue metformin 1000mg BID  -foot exam updated today, order provided to check ur alb, labs updated 11/2021, eye exam due and should be done yearly  -metformin and novolog are refilled by pcp      1.  Diet: 3-4 carb servings per meal for females, 4-5 carb servings per meal for males  Spread carb intake throughout the day  Increase lean protein and vegetable intake  Avoid sugary drinks and processed carbs including crackers, cookies, cakes  2.  Exercise: Recommend at least 30 minutes of exercise daily, at least 5 days per week. Increase exercise gradually.   3.  Blood Glucose Goal: Blood glucose goal <150 fasting, <180 2 hr postprandial  4.  Microalbumin due 2/2021  5.  Education performed during this visit: long term diabetic complications discussed. , annual eye examinations at Ophthalmology discussed, dental hygiene discussed  and foot care reviewed., home glucose monitoring emphasized, all medications, side effects and compliance discussed carefully and Hypoglycemia management and prevention reviewed. Reviewed ‘ABCs’ of diabetes management (respective goals in parentheses):  A1C (<7), blood pressure (<130/80), and cholesterol (LDL <100, if CVD <70).    Hyperlipidemia  -lipid panel utd 11/2021 - LDL within guidelines  -continue pravastatin 40 mg qhs    There are no Patient Instructions on file for this visit.    Follow up: Return in about 3 months (around 4/12/2022).     Discussed the nature of the disease including, risks, complications, implications, management, safe and proper use of medications. Encouraged therapeutic lifestyle changes including low calorie diet with plenty of fruits and vegetables, daily exercise, medication compliance, and keeping  scheduled follow up appointments with me and any other providers. Encouraged patient to have appointment for complete physical, fasting labs, appropriate screenings, and immunizations on an annual basis.      Trevor Jang PA-C

## 2022-06-13 ENCOUNTER — OFFICE VISIT (OUTPATIENT)
Dept: ENDOCRINOLOGY | Facility: CLINIC | Age: 72
End: 2022-06-13

## 2022-06-13 VITALS
WEIGHT: 212 LBS | BODY MASS INDEX: 30.35 KG/M2 | HEART RATE: 76 BPM | SYSTOLIC BLOOD PRESSURE: 120 MMHG | DIASTOLIC BLOOD PRESSURE: 72 MMHG | OXYGEN SATURATION: 96 % | HEIGHT: 70 IN

## 2022-06-13 DIAGNOSIS — E78.2 MIXED HYPERLIPIDEMIA: Chronic | ICD-10-CM

## 2022-06-13 DIAGNOSIS — E11.9 TYPE 2 DIABETES MELLITUS WITHOUT COMPLICATION, WITH LONG-TERM CURRENT USE OF INSULIN: Primary | Chronic | ICD-10-CM

## 2022-06-13 DIAGNOSIS — Z79.4 TYPE 2 DIABETES MELLITUS WITHOUT COMPLICATION, WITH LONG-TERM CURRENT USE OF INSULIN: Primary | Chronic | ICD-10-CM

## 2022-06-13 LAB
GLUCOSE BLDC GLUCOMTR-MCNC: 335 MG/DL (ref 70–130)
HBA1C MFR BLD: 7 %

## 2022-06-13 PROCEDURE — 82947 ASSAY GLUCOSE BLOOD QUANT: CPT | Performed by: PHYSICIAN ASSISTANT

## 2022-06-13 PROCEDURE — 99214 OFFICE O/P EST MOD 30 MIN: CPT | Performed by: PHYSICIAN ASSISTANT

## 2022-06-13 NOTE — PROGRESS NOTES
Chief Complaint  F/u for Diabetes Mellitus.    HPI   Neno Green is a 72 y.o. male with afib on multaq and xarelto, who is here today for f/u of Diabetes Mellitus type 2. The initial diagnosis of diabetes was made at age 48.     Pt is doing well.    during visit today. He had a kink/bloackage in his pump during the night. He turned the pump off and turned it back on today.     A1c- 7 (6/13/2022), 7.5 (1/12/22), 6.8 (9/22/2021), 6.6 (5/15/2021), 6.5 (8/19/19), 7 (1/22/19), 6.2 (9/21/18), 6.8 (7/23/18), 6.4 (3/22/18)    Diabetic complications: none  Eye exam current (within one year): updated 4/2022 ([facility])    Current diabetic medications include:  Novolog, on insulin pump  Metformin 1000mg BID    Statin: pravastatin 40, tricor    Past medications: po meds    Diabetic Monitoring  -   Medtronic pump downloaded and reviewed- checks BG 0-2 times per day, enters carbs 0-3 times per day, BG readings range mostly from 110-130s though he reports not all these readings are actual readings, no hypoglycemia per pump download or patient report    The following portions of the patient's history were reviewed and updated by me as appropriate: allergies, current medications, past family history, past social history, past surgical history and problem list.      Past Medical History:   Diagnosis Date   • Acid reflux    • Arthritis    • Asthma    • Depression    • Heart attack (HCC)    • Heart disease    • Hyperlipidemia    • Hypertension    • Migraine    • Type 2 diabetes mellitus (HCC)        Medications    Current Outpatient Medications:   •  Blood Glucose Monitoring Suppl (TRUE METRIX AIR GLUCOSE METER) w/Device kit, , Disp: , Rfl:   •  carvedilol (COREG) 6.25 MG tablet, , Disp: , Rfl:   •  fenofibrate (TRICOR) 145 MG tablet, , Disp: , Rfl:   •  fluticasone (FLONASE) 50 MCG/ACT nasal spray, SHAKE LQ AND U 2 SPRAYS IEN D, Disp: , Rfl: 3  •  isosorbide mononitrate (IMDUR) 30 MG 24 hr tablet, , Disp: , Rfl:   •   "losartan (COZAAR) 50 MG tablet, , Disp: , Rfl:   •  metFORMIN (GLUCOPHAGE) 1000 MG tablet, 1,000 mg 2 (Two) Times a Day With Meals., Disp: , Rfl:   •  MULTAQ 400 MG tablet, , Disp: , Rfl:   •  nitroglycerin (NITROSTAT) 0.4 MG SL tablet, , Disp: , Rfl:   •  NOVOLOG 100 UNIT/ML injection, Via pump, Disp: , Rfl:   •  omeprazole (priLOSEC) 40 MG capsule, , Disp: , Rfl:   •  pravastatin (PRAVACHOL) 40 MG tablet, , Disp: , Rfl:   •  TRUE METRIX BLOOD GLUCOSE TEST test strip, , Disp: , Rfl:   •  TRUEPLUS LANCETS 33G misc, , Disp: , Rfl:   •  XARELTO 20 MG tablet, Take  by mouth Daily., Disp: , Rfl: 2    Review of Systems  Review of Systems   All other systems reviewed and are negative.       Physical Exam    /72   Pulse 76   Ht 177.8 cm (70\")   Wt 96.2 kg (212 lb)   SpO2 96%   BMI 30.42 kg/m² Body mass index is 30.42 kg/m².  Physical Exam   Constitutional: He is oriented to person, place, and time. He appears well-developed. No distress.   HENT:   Head: Normocephalic.   Right Ear: External ear normal.   Left Ear: External ear normal.   Nose: Nose normal.   Eyes: Conjunctivae are normal. Right eye exhibits no discharge. Left eye exhibits no discharge. No scleral icterus.   Neck: No JVD present. No tracheal deviation present. No thyromegaly present.   Cardiovascular: Normal rate, regular rhythm and normal heart sounds.   No murmur heard.  Pulmonary/Chest: Effort normal and breath sounds normal. No respiratory distress. He has no wheezes.   Musculoskeletal: No tenderness.   Neurological: He is alert and oriented to person, place, and time.   Skin: Skin is warm and dry. No rash noted. He is not diaphoretic. No erythema.   Psychiatric: His behavior is normal. Judgment and thought content normal.       Labs and Imaging   Lab Results   Component Value Date    HGBA1C 7.0 05/19/2022    HGBA1C 7.5 01/12/2022    HGBA1C 6.8 09/22/2021     Office Visit on 06/13/2022   Component Date Value Ref Range Status   • Glucose " 06/13/2022 335 (A) 70 - 130 mg/dL Final   • External Hemoglobin A1C 05/19/2022 7.0   Final     External labs reviewed:  5/19/2022: A1c 7, TSH 2.83, microalbumin/creatinine ratio 3, LDL 57, triglycerides 143, creatinine 1.42, GFR 48, AST 17, ALT 16, hemoglobin 13.3, hematocrit 39.6  11/22/2021: Triglycerides 94, LDL 51, glucose 110, creatinine 1.35, GFR 52, calcium 10.1, albumin 4.3, AST 20, ALT 16, hemoglobin 13.6, hematocrit 41, A1c 7.2, TSH 2.58    Assessment / Plan   Diagnoses and all orders for this visit:    1. Type 2 diabetes mellitus without complication, with long-term current use of insulin (HCC) (Primary)  -     POC Glucose, Blood  -     Cancel: POC Glycosylated Hemoglobin (Hb A1C)    2. Mixed hyperlipidemia        Diabetes Mellitus 2 is under good control on insulin pump.  -A1c 7  -we discussed again how the pump works and need to carbs before all meals, also explained correction features, need to check sugar before meals and add those with the carbs (discussed not putting random readings into pump without checking sugar)  -continue metformin 1000mg BID  -foot exam updated 1/2022, order provided to check ur alb, labs updated 11/2021, eye exam is utd  -metformin and novolog are refilled by pcp    Hyperlipidemia  -lipid panel utd 5/2022- LDL within guidelines  -continue pravastatin 40 mg qhs    There are no Patient Instructions on file for this visit.    Follow up: Return in about 3 months (around 9/13/2022).       Trevor Jang PA-C

## 2022-06-17 PROBLEM — E11.3293 MILD NONPROLIFERATIVE DIABETIC RETINOPATHY OF BOTH EYES WITHOUT MACULAR EDEMA ASSOCIATED WITH TYPE 2 DIABETES MELLITUS: Status: ACTIVE | Noted: 2022-06-17

## 2022-09-28 ENCOUNTER — OFFICE VISIT (OUTPATIENT)
Dept: ENDOCRINOLOGY | Facility: CLINIC | Age: 72
End: 2022-09-28

## 2022-09-28 VITALS
HEIGHT: 70 IN | WEIGHT: 213 LBS | DIASTOLIC BLOOD PRESSURE: 74 MMHG | SYSTOLIC BLOOD PRESSURE: 126 MMHG | HEART RATE: 73 BPM | BODY MASS INDEX: 30.49 KG/M2 | OXYGEN SATURATION: 97 %

## 2022-09-28 DIAGNOSIS — E78.2 MIXED HYPERLIPIDEMIA: Chronic | ICD-10-CM

## 2022-09-28 DIAGNOSIS — E11.9 TYPE 2 DIABETES MELLITUS WITHOUT COMPLICATION, WITH LONG-TERM CURRENT USE OF INSULIN: Primary | Chronic | ICD-10-CM

## 2022-09-28 DIAGNOSIS — Z79.4 TYPE 2 DIABETES MELLITUS WITHOUT COMPLICATION, WITH LONG-TERM CURRENT USE OF INSULIN: Primary | Chronic | ICD-10-CM

## 2022-09-28 LAB
EXPIRATION DATE: NORMAL
EXPIRATION DATE: NORMAL
GLUCOSE BLDC GLUCOMTR-MCNC: 130 MG/DL (ref 70–130)
HBA1C MFR BLD: 7 %
Lab: NORMAL
Lab: NORMAL

## 2022-09-28 PROCEDURE — 3051F HG A1C>EQUAL 7.0%<8.0%: CPT | Performed by: PHYSICIAN ASSISTANT

## 2022-09-28 PROCEDURE — 82947 ASSAY GLUCOSE BLOOD QUANT: CPT | Performed by: PHYSICIAN ASSISTANT

## 2022-09-28 PROCEDURE — 99214 OFFICE O/P EST MOD 30 MIN: CPT | Performed by: PHYSICIAN ASSISTANT

## 2022-09-28 PROCEDURE — 83036 HEMOGLOBIN GLYCOSYLATED A1C: CPT | Performed by: PHYSICIAN ASSISTANT

## 2022-09-28 RX ORDER — SUBCUTANEOUS INSULIN PUMP
EACH MISCELLANEOUS
COMMUNITY

## 2022-09-28 RX ORDER — METFORMIN HYDROCHLORIDE 500 MG/1
500 TABLET, EXTENDED RELEASE ORAL 2 TIMES DAILY
COMMUNITY
Start: 2022-09-19 | End: 2023-01-31 | Stop reason: SINTOL

## 2022-09-28 NOTE — PROGRESS NOTES
Chief Complaint  F/u for Diabetes Mellitus.    HPI   Neno Green is a 72 y.o. male with afib on multaq and xarelto, who is here today for f/u of Diabetes Mellitus type 2. The initial diagnosis of diabetes was made at age 48.     Pt is doing well.   Metformin changed from 1000 mg BID to  mg BID by PCP due to diarrhea, which has improved the side effects.     Diabetic complications: none  Eye exam current (within one year): updated 5/2022 ([facility])    Current diabetic medications include:  Novolog, on insulin pump  Metformin 1000mg BID    Statin: pravastatin 40, tricor    Past medications: po meds    Diabetic Monitoring  -   Medtronic pump downloaded and reviewed- checks BG 0-2 times per day, enters carbs 0-4 times per day, BG readings range 111-190 with most readings <150, no hypoglycemia    The following portions of the patient's history were reviewed and updated by me as appropriate: allergies, current medications, past family history, past social history, past surgical history and problem list.      Past Medical History:   Diagnosis Date   • Acid reflux    • Arthritis    • Asthma    • Depression    • Heart attack (HCC)    • Heart disease    • Hyperlipidemia    • Hypertension    • Migraine    • Type 2 diabetes mellitus (HCC)        Medications    Current Outpatient Medications:   •  Blood Glucose Monitoring Suppl (TRUE METRIX AIR GLUCOSE METER) w/Device kit, , Disp: , Rfl:   •  carvedilol (COREG) 6.25 MG tablet, Take 6.25 mg by mouth 2 (Two) Times a Day With Meals., Disp: , Rfl:   •  fenofibrate (TRICOR) 145 MG tablet, Take 145 mg by mouth Daily., Disp: , Rfl:   •  fluticasone (FLONASE) 50 MCG/ACT nasal spray, SHAKE LQ AND U 2 SPRAYS IEN D, Disp: , Rfl: 3  •  Insulin Infusion Pump (MiniMed 670G Insulin Pump) device, , Disp: , Rfl:   •  isosorbide mononitrate (IMDUR) 30 MG 24 hr tablet, Take 30 mg by mouth Daily., Disp: , Rfl:   •  losartan (COZAAR) 50 MG tablet, Take 50 mg by mouth 2 (Two) Times a  "Day., Disp: , Rfl:   •  metFORMIN ER (GLUCOPHAGE-XR) 500 MG 24 hr tablet, Take 500 mg by mouth 2 (Two) Times a Day., Disp: , Rfl:   •  MULTAQ 400 MG tablet, , Disp: , Rfl:   •  nitroglycerin (NITROSTAT) 0.4 MG SL tablet, Place 0.4 mg under the tongue As Needed., Disp: , Rfl:   •  NOVOLOG 100 UNIT/ML injection, MDD of 20 units Via pump daily, Disp: , Rfl:   •  omeprazole (priLOSEC) 40 MG capsule, Take 40 mg by mouth As Needed., Disp: , Rfl:   •  pravastatin (PRAVACHOL) 40 MG tablet, Take 20 mg by mouth Daily., Disp: , Rfl:   •  TRUE METRIX BLOOD GLUCOSE TEST test strip, , Disp: , Rfl:   •  TRUEPLUS LANCETS 33G misc, , Disp: , Rfl:   •  XARELTO 20 MG tablet, Take  by mouth Daily., Disp: , Rfl: 2    Review of Systems  Review of Systems   All other systems reviewed and are negative.       Physical Exam    /74   Pulse 73   Ht 177.8 cm (70\")   Wt 96.6 kg (213 lb)   SpO2 97%   BMI 30.56 kg/m² Body mass index is 30.56 kg/m².  Physical Exam   Constitutional: He is oriented to person, place, and time. He appears well-developed. No distress.   HENT:   Head: Normocephalic.   Right Ear: External ear normal.   Left Ear: External ear normal.   Nose: Nose normal.   Eyes: Conjunctivae are normal. Right eye exhibits no discharge. Left eye exhibits no discharge. No scleral icterus.   Neck: No JVD present. No tracheal deviation present. No thyromegaly present.   Cardiovascular: Normal rate, regular rhythm and normal heart sounds.   No murmur heard.  Pulmonary/Chest: Effort normal and breath sounds normal. No respiratory distress. He has no wheezes.   Musculoskeletal: No tenderness.   Neurological: He is alert and oriented to person, place, and time.   Skin: Skin is warm and dry. No rash noted. He is not diaphoretic. No erythema.   Psychiatric: His behavior is normal. Judgment and thought content normal.       Labs and Imaging   Lab Results   Component Value Date    HGBA1C 7.0 09/28/2022    HGBA1C 7.0 05/19/2022    HGBA1C 7.5 " 01/12/2022     Office Visit on 09/28/2022   Component Date Value Ref Range Status   • Hemoglobin A1C 09/28/2022 7.0  % Final   • Lot Number 09/28/2022 [id number]   Final   • Expiration Date 09/28/2022 04-   Final   • Glucose 09/28/2022 130  70 - 130 mg/dL Final   • Lot Number 09/28/2022 [id number]   Final   • Expiration Date 09/28/2022 03-   Final     External labs reviewed:  5/19/2022: A1c 7, TSH 2.83, microalbumin/creatinine ratio 3, LDL 57, triglycerides 143, creatinine 1.42, GFR 48, AST 17, ALT 16, hemoglobin 13.3, hematocrit 39.6  11/22/2021: Triglycerides 94, LDL 51, glucose 110, creatinine 1.35, GFR 52, calcium 10.1, albumin 4.3, AST 20, ALT 16, hemoglobin 13.6, hematocrit 41, A1c 7.2, TSH 2.58    Assessment / Plan   Diagnoses and all orders for this visit:    1. Type 2 diabetes mellitus without complication, with long-term current use of insulin (HCC) (Primary)  -     POC Glycosylated Hemoglobin (Hb A1C)  -     POC Glucose, Blood    2. Mixed hyperlipidemia        Diabetes Mellitus 2 is under good control on insulin pump.  -A1c 7,   -needs low cost medications  -no pump setting adjustments  -continue metformin  mg BID  -foot exam updated 1/2022, labs were done 5/2022, eye exam is utd (due 5/2023)  -metformin and novolog are refilled by pcp    Hyperlipidemia  -lipid panel utd 5/2022- LDL within guidelines  -continue pravastatin 40 mg qhs    There are no Patient Instructions on file for this visit.    Follow up: Return in about 4 months (around 1/28/2023).       Trevor Jang PA-C

## 2023-01-31 ENCOUNTER — OFFICE VISIT (OUTPATIENT)
Dept: ENDOCRINOLOGY | Facility: CLINIC | Age: 73
End: 2023-01-31
Payer: MEDICARE

## 2023-01-31 VITALS
HEART RATE: 61 BPM | OXYGEN SATURATION: 96 % | WEIGHT: 215 LBS | DIASTOLIC BLOOD PRESSURE: 80 MMHG | SYSTOLIC BLOOD PRESSURE: 130 MMHG | HEIGHT: 70 IN | BODY MASS INDEX: 30.78 KG/M2

## 2023-01-31 DIAGNOSIS — Z79.4 TYPE 2 DIABETES MELLITUS WITHOUT COMPLICATION, WITH LONG-TERM CURRENT USE OF INSULIN: Primary | Chronic | ICD-10-CM

## 2023-01-31 DIAGNOSIS — E78.2 MIXED HYPERLIPIDEMIA: Chronic | ICD-10-CM

## 2023-01-31 DIAGNOSIS — E11.9 TYPE 2 DIABETES MELLITUS WITHOUT COMPLICATION, WITH LONG-TERM CURRENT USE OF INSULIN: Primary | Chronic | ICD-10-CM

## 2023-01-31 LAB
EXPIRATION DATE: ABNORMAL
GLUCOSE BLDC GLUCOMTR-MCNC: 138 MG/DL (ref 70–130)
Lab: ABNORMAL

## 2023-01-31 PROCEDURE — 82947 ASSAY GLUCOSE BLOOD QUANT: CPT | Performed by: PHYSICIAN ASSISTANT

## 2023-01-31 PROCEDURE — 99214 OFFICE O/P EST MOD 30 MIN: CPT | Performed by: PHYSICIAN ASSISTANT

## 2023-01-31 RX ORDER — DAPAGLIFLOZIN 10 MG/1
10 TABLET, FILM COATED ORAL DAILY
Qty: 90 TABLET | Refills: 1 | Status: SHIPPED | OUTPATIENT
Start: 2023-01-31 | End: 2023-02-22 | Stop reason: SDUPTHER

## 2023-01-31 NOTE — LETTER
January 31, 2023     Aureliano Aguilar MD  [address] [facility] [address]    Patient: Neno Green   YOB: 1950   Date of Visit: 1/31/2023       Dear Dr. Jeff MD:    I saw Neno Green for his diabetes today. Below are the relevant portions of my assessment and plan of care.    If you have questions, please do not hesitate to call me. I look forward to following Neno along with you.         Sincerely,        Trevor Jang PA-C        CC: No Recipients  Trevor Jang PA-C  01/31/23 1146  Sign when Signing Visit  Chief Complaint  F/u for Diabetes Mellitus.    HPI  Neno Green is a 73 y.o. male with afib on multaq and xarelto, who is here today for f/u of Diabetes Mellitus type 2. The initial diagnosis of diabetes was made at age 48.       Diabetic complications: none  Eye exam current (within one year): updated 5/2022 ([facility]) - macular degeneration     Current diabetic medications include:  Novolog, on insulin pump  Metformin  mg BID    Statin: pravastatin 40, tricor    Past medications: po meds    Diabetic Monitoring  -   Medtronic pump downloaded and reviewed-64% basal, 36% bolus, entering blood sugars and carbs 0-3 times per day, blood sugars range 110-149, he reports he often forgets to enter carbs until later, denies hypoglycemia    The following portions of the patient's history were reviewed and updated by me as appropriate: allergies, current medications, past family history, past social history, past surgical history and problem list.    Past Medical History:   Diagnosis Date   • Acid reflux    • Arthritis    • Asthma    • Depression    • Heart attack (HCC)    • Heart disease    • Hyperlipidemia    • Hypertension    • Migraine    • Testosterone deficiency ??   • Type 2 diabetes mellitus (HCC)        Medications    Current Outpatient Medications:   •  Blood Glucose Monitoring Suppl (TRUE METRIX AIR GLUCOSE METER) w/Device kit, , Disp: , Rfl:   •  carvedilol  "(COREG) 6.25 MG tablet, Take 6.25 mg by mouth 2 (Two) Times a Day With Meals., Disp: , Rfl:   •  fenofibrate (TRICOR) 145 MG tablet, Take 145 mg by mouth Daily., Disp: , Rfl:   •  fluticasone (FLONASE) 50 MCG/ACT nasal spray, SHAKE LQ AND U 2 SPRAYS IEN D, Disp: , Rfl: 3  •  Insulin Infusion Pump (MiniMed 670G Insulin Pump) device, , Disp: , Rfl:   •  isosorbide mononitrate (IMDUR) 30 MG 24 hr tablet, Take 30 mg by mouth Daily., Disp: , Rfl:   •  losartan (COZAAR) 50 MG tablet, Take 50 mg by mouth 2 (Two) Times a Day., Disp: , Rfl:   •  MULTAQ 400 MG tablet, Take 400 mg by mouth 2 (Two) Times a Day With Meals., Disp: , Rfl:   •  nitroglycerin (NITROSTAT) 0.4 MG SL tablet, Place 0.4 mg under the tongue As Needed., Disp: , Rfl:   •  NOVOLOG 100 UNIT/ML injection, MDD of 20 units Via pump daily, Disp: , Rfl:   •  omeprazole (priLOSEC) 40 MG capsule, Take 40 mg by mouth As Needed., Disp: , Rfl:   •  pravastatin (PRAVACHOL) 40 MG tablet, Take 20 mg by mouth Daily., Disp: , Rfl:   •  TRUE METRIX BLOOD GLUCOSE TEST test strip, , Disp: , Rfl:   •  TRUEPLUS LANCETS 33G misc, , Disp: , Rfl:   •  XARELTO 20 MG tablet, Take  by mouth Daily., Disp: , Rfl: 2  •  dapagliflozin Propanediol (Farxiga) 10 MG tablet, Take 10 mg by mouth Daily., Disp: 90 tablet, Rfl: 1    Review of Systems  Review of Systems   All other systems reviewed and are negative.       Physical Exam   /80   Pulse 61   Ht 177.8 cm (70\")   Wt 97.5 kg (215 lb)   SpO2 96%   BMI 30.85 kg/m² Body mass index is 30.85 kg/m².  Physical Exam   Constitutional: He is oriented to person, place, and time. He appears well-developed. No distress.   HENT:   Head: Normocephalic.   Right Ear: External ear normal.   Left Ear: External ear normal.   Nose: Nose normal.   Eyes: Conjunctivae are normal. Right eye exhibits no discharge. Left eye exhibits no discharge. No scleral icterus.   Neck: No JVD present. No tracheal deviation present. No thyromegaly present. "   Cardiovascular: Normal rate, regular rhythm and normal heart sounds.   No murmur heard.  Pulmonary/Chest: Effort normal and breath sounds normal. No respiratory distress. He has no wheezes.   Musculoskeletal: No tenderness.    Neno had a diabetic foot exam performed today.   During the foot exam he had a monofilament test performed.    Neurological Sensory Findings - Altered hot/cold right ankle/foot discrimination and altered hot/cold left ankle/foot discrimination. Altered sharp/dull right ankle/foot discrimination and altered sharp/dull left ankle/foot discrimination. Right ankle/foot altered proprioception and left ankle/foot altered proprioception.  Vascular Status -  His right foot exhibits normal foot vasculature  and no edema. His left foot exhibits normal foot vasculature  and no edema.  Skin Integrity  -  His right foot skin is intact.  He has no right foot onychomycosis, no right foot ulcer and non-callous right foot.His left foot skin is intact. He has no left foot onychomycosis, no left foot ulcer and non-callous left foot..  Neurological: He is alert and oriented to person, place, and time.   Skin: Skin is warm and dry. No rash noted. He is not diaphoretic. No erythema.   Psychiatric: His behavior is normal. Judgment and thought content normal.       Labs and Imaging   Lab Results   Component Value Date    HGBA1C 7.0 09/28/2022    HGBA1C 7.0 05/19/2022    HGBA1C 7.5 01/12/2022     Office Visit on 01/31/2023   Component Date Value Ref Range Status   • Glucose 01/31/2023 138 (A)  70 - 130 mg/dL Final   • Lot Number 01/31/2023 [id number]   Final   • Expiration Date 01/31/2023 07-   Final     External labs reviewed:  1/12/2023: A1c 6.9, TSH 3.24, triglycerides 93, LDL 45, creatinine 1.34, GFR 55, AST 19, ALT 15, hemoglobin 13.1, hematocrit 39.4  9/15/2022: LDL 66, triglycerides 172, creatinine 1.42, GFR 52, ALT 16, AST 20, hemoglobin 13.4, hematocrit 41, A1c 7, TSH 2.25, microalbumin/creatinine  ratio 3    Assessment / Plan   Diagnoses and all orders for this visit:    1. Type 2 diabetes mellitus without complication, with long-term current use of insulin (HCC) (Primary)  -     Cancel: POC Glycosylated Hemoglobin (Hb A1C)  -     POC Glucose, Blood  -     dapagliflozin Propanediol (Farxiga) 10 MG tablet; Take 10 mg by mouth Daily.  Dispense: 90 tablet; Refill: 1    2. Mixed hyperlipidemia        Diabetes Mellitus 2 is under good control on insulin pump.  -A1c 6.9 1/12/2023,  today  -He denies hypoglycemia  -no pump setting adjustments  -Discussed counting carbs and trying to add those before eating  -previously needed low cost medication, but will try again to get brand name medications covered for added renal and cv benefits. If not covered we will attempt to get pt assitsance  -change metformin  mg BID to Farxiga 10 mg daily   -Add GLP-1 agonist next time  -foot exam updated today, labs were done 1/2023, urine albumin due 9/2023, eye exam is utd (due 5/2023)    Hyperlipidemia  -lipid panel utd 1/2023- LDL within guidelines  -continue pravastatin 40 mg qhs    There are no Patient Instructions on file for this visit.    Follow up: Return in about 3 months (around 4/30/2023).       Trevor Jang PA-C

## 2023-01-31 NOTE — PROGRESS NOTES
Chief Complaint  F/u for Diabetes Mellitus.    HPI   Neno Green is a 73 y.o. male with afib on multaq and xarelto, who is here today for f/u of Diabetes Mellitus type 2. The initial diagnosis of diabetes was made at age 48.       Diabetic complications: none  Eye exam current (within one year): updated 5/2022 ([facility]) - macular degeneration     Current diabetic medications include:  Novolog, on insulin pump  Metformin  mg BID    Statin: pravastatin 40, tricor    Past medications: po meds    Diabetic Monitoring  -   Medtronic pump downloaded and reviewed-64% basal, 36% bolus, entering blood sugars and carbs 0-3 times per day, blood sugars range 110-149, he reports he often forgets to enter carbs until later, denies hypoglycemia    The following portions of the patient's history were reviewed and updated by me as appropriate: allergies, current medications, past family history, past social history, past surgical history and problem list.    Past Medical History:   Diagnosis Date   • Acid reflux    • Arthritis    • Asthma    • Depression    • Heart attack (HCC)    • Heart disease    • Hyperlipidemia    • Hypertension    • Migraine    • Testosterone deficiency ??   • Type 2 diabetes mellitus (HCC)        Medications    Current Outpatient Medications:   •  Blood Glucose Monitoring Suppl (TRUE METRIX AIR GLUCOSE METER) w/Device kit, , Disp: , Rfl:   •  carvedilol (COREG) 6.25 MG tablet, Take 6.25 mg by mouth 2 (Two) Times a Day With Meals., Disp: , Rfl:   •  fenofibrate (TRICOR) 145 MG tablet, Take 145 mg by mouth Daily., Disp: , Rfl:   •  fluticasone (FLONASE) 50 MCG/ACT nasal spray, SHAKE LQ AND U 2 SPRAYS IEN D, Disp: , Rfl: 3  •  Insulin Infusion Pump (MiniMed 670G Insulin Pump) device, , Disp: , Rfl:   •  isosorbide mononitrate (IMDUR) 30 MG 24 hr tablet, Take 30 mg by mouth Daily., Disp: , Rfl:   •  losartan (COZAAR) 50 MG tablet, Take 50 mg by mouth 2 (Two) Times a Day., Disp: , Rfl:   •  MULTAQ  "400 MG tablet, Take 400 mg by mouth 2 (Two) Times a Day With Meals., Disp: , Rfl:   •  nitroglycerin (NITROSTAT) 0.4 MG SL tablet, Place 0.4 mg under the tongue As Needed., Disp: , Rfl:   •  NOVOLOG 100 UNIT/ML injection, MDD of 20 units Via pump daily, Disp: , Rfl:   •  omeprazole (priLOSEC) 40 MG capsule, Take 40 mg by mouth As Needed., Disp: , Rfl:   •  pravastatin (PRAVACHOL) 40 MG tablet, Take 20 mg by mouth Daily., Disp: , Rfl:   •  TRUE METRIX BLOOD GLUCOSE TEST test strip, , Disp: , Rfl:   •  TRUEPLUS LANCETS 33G misc, , Disp: , Rfl:   •  XARELTO 20 MG tablet, Take  by mouth Daily., Disp: , Rfl: 2  •  dapagliflozin Propanediol (Farxiga) 10 MG tablet, Take 10 mg by mouth Daily., Disp: 90 tablet, Rfl: 1    Review of Systems  Review of Systems   All other systems reviewed and are negative.       Physical Exam    /80   Pulse 61   Ht 177.8 cm (70\")   Wt 97.5 kg (215 lb)   SpO2 96%   BMI 30.85 kg/m² Body mass index is 30.85 kg/m².  Physical Exam   Constitutional: He is oriented to person, place, and time. He appears well-developed. No distress.   HENT:   Head: Normocephalic.   Right Ear: External ear normal.   Left Ear: External ear normal.   Nose: Nose normal.   Eyes: Conjunctivae are normal. Right eye exhibits no discharge. Left eye exhibits no discharge. No scleral icterus.   Neck: No JVD present. No tracheal deviation present. No thyromegaly present.   Cardiovascular: Normal rate, regular rhythm and normal heart sounds.   No murmur heard.  Pulmonary/Chest: Effort normal and breath sounds normal. No respiratory distress. He has no wheezes.   Musculoskeletal: No tenderness.    Neno had a diabetic foot exam performed today.   During the foot exam he had a monofilament test performed.    Neurological Sensory Findings - Altered hot/cold right ankle/foot discrimination and altered hot/cold left ankle/foot discrimination. Altered sharp/dull right ankle/foot discrimination and altered sharp/dull left " ankle/foot discrimination. Right ankle/foot altered proprioception and left ankle/foot altered proprioception.  Vascular Status -  His right foot exhibits normal foot vasculature  and no edema. His left foot exhibits normal foot vasculature  and no edema.  Skin Integrity  -  His right foot skin is intact.  He has no right foot onychomycosis, no right foot ulcer and non-callous right foot.His left foot skin is intact. He has no left foot onychomycosis, no left foot ulcer and non-callous left foot..  Neurological: He is alert and oriented to person, place, and time.   Skin: Skin is warm and dry. No rash noted. He is not diaphoretic. No erythema.   Psychiatric: His behavior is normal. Judgment and thought content normal.       Labs and Imaging   Lab Results   Component Value Date    HGBA1C 7.0 09/28/2022    HGBA1C 7.0 05/19/2022    HGBA1C 7.5 01/12/2022     Office Visit on 01/31/2023   Component Date Value Ref Range Status   • Glucose 01/31/2023 138 (A)  70 - 130 mg/dL Final   • Lot Number 01/31/2023 [id number]   Final   • Expiration Date 01/31/2023 07-   Final     External labs reviewed:  1/12/2023: A1c 6.9, TSH 3.24, triglycerides 93, LDL 45, creatinine 1.34, GFR 55, AST 19, ALT 15, hemoglobin 13.1, hematocrit 39.4  9/15/2022: LDL 66, triglycerides 172, creatinine 1.42, GFR 52, ALT 16, AST 20, hemoglobin 13.4, hematocrit 41, A1c 7, TSH 2.25, microalbumin/creatinine ratio 3    Assessment / Plan   Diagnoses and all orders for this visit:    1. Type 2 diabetes mellitus without complication, with long-term current use of insulin (HCC) (Primary)  -     Cancel: POC Glycosylated Hemoglobin (Hb A1C)  -     POC Glucose, Blood  -     dapagliflozin Propanediol (Farxiga) 10 MG tablet; Take 10 mg by mouth Daily.  Dispense: 90 tablet; Refill: 1    2. Mixed hyperlipidemia        Diabetes Mellitus 2 is under good control on insulin pump.  -A1c 6.9 1/12/2023,  today  -He denies hypoglycemia  -no pump setting  adjustments  -Discussed counting carbs and trying to add those before eating  -previously needed low cost medication, but will try again to get brand name medications covered for added renal and cv benefits. If not covered we will attempt to get pt assitsance  -change metformin  mg BID to Farxiga 10 mg daily   -Add GLP-1 agonist next time  -foot exam updated today, labs were done 1/2023, urine albumin due 9/2023, eye exam is utd (due 5/2023)    Hyperlipidemia  -lipid panel utd 1/2023- LDL within guidelines  -continue pravastatin 40 mg qhs    There are no Patient Instructions on file for this visit.    Follow up: Return in about 3 months (around 4/30/2023).       Trevor Jang PA-C

## 2023-02-09 ENCOUNTER — TELEPHONE (OUTPATIENT)
Dept: ENDOCRINOLOGY | Facility: CLINIC | Age: 73
End: 2023-02-09
Payer: MEDICARE

## 2023-02-09 NOTE — TELEPHONE ENCOUNTER
Patient called stated farxiga is going to be to expensive. He said Trevor told him to call if this happened. Please advise.

## 2023-02-13 ENCOUNTER — TELEPHONE (OUTPATIENT)
Dept: ENDOCRINOLOGY | Facility: CLINIC | Age: 73
End: 2023-02-13
Payer: MEDICARE

## 2023-02-13 NOTE — TELEPHONE ENCOUNTER
Called patient to let him know I filled out the patient assistance application for Farxiga. He said he would come by Wednesday to get forms signed and provide total household income.

## 2023-02-22 DIAGNOSIS — Z79.4 TYPE 2 DIABETES MELLITUS WITHOUT COMPLICATION, WITH LONG-TERM CURRENT USE OF INSULIN: Chronic | ICD-10-CM

## 2023-02-22 DIAGNOSIS — E11.9 TYPE 2 DIABETES MELLITUS WITHOUT COMPLICATION, WITH LONG-TERM CURRENT USE OF INSULIN: Chronic | ICD-10-CM

## 2023-02-22 RX ORDER — DAPAGLIFLOZIN 10 MG/1
10 TABLET, FILM COATED ORAL DAILY
Qty: 30 TABLET | Refills: 11 | Status: SHIPPED | OUTPATIENT
Start: 2023-02-22

## 2023-02-22 NOTE — TELEPHONE ENCOUNTER
Sending patient assistance application to [employer] for pt's Farxiga 10 mg. Pending and set to print to be able to fax it.

## 2023-03-01 ENCOUNTER — TELEPHONE (OUTPATIENT)
Dept: ENDOCRINOLOGY | Facility: CLINIC | Age: 73
End: 2023-03-01
Payer: MEDICARE

## 2023-03-01 NOTE — TELEPHONE ENCOUNTER
Called pt to let him know that he was approved for pt assistance for Farxiga until the end of the year. He voiced understanding.

## 2023-03-21 ENCOUNTER — TELEPHONE (OUTPATIENT)
Dept: ENDOCRINOLOGY | Facility: CLINIC | Age: 73
End: 2023-03-21
Payer: MEDICARE

## 2023-03-21 NOTE — TELEPHONE ENCOUNTER
Patient called reinier Villa to know he did receive farxiga and he started it on Sunday. Please advise.

## 2023-05-10 ENCOUNTER — OFFICE VISIT (OUTPATIENT)
Dept: ENDOCRINOLOGY | Facility: CLINIC | Age: 73
End: 2023-05-10
Payer: MEDICARE

## 2023-05-10 VITALS
WEIGHT: 209 LBS | HEIGHT: 70 IN | OXYGEN SATURATION: 98 % | SYSTOLIC BLOOD PRESSURE: 130 MMHG | DIASTOLIC BLOOD PRESSURE: 70 MMHG | HEART RATE: 67 BPM | BODY MASS INDEX: 29.92 KG/M2

## 2023-05-10 DIAGNOSIS — Z79.4 TYPE 2 DIABETES MELLITUS WITHOUT COMPLICATION, WITH LONG-TERM CURRENT USE OF INSULIN: Primary | Chronic | ICD-10-CM

## 2023-05-10 DIAGNOSIS — E78.2 MIXED HYPERLIPIDEMIA: Chronic | ICD-10-CM

## 2023-05-10 DIAGNOSIS — E11.9 TYPE 2 DIABETES MELLITUS WITHOUT COMPLICATION, WITH LONG-TERM CURRENT USE OF INSULIN: Primary | Chronic | ICD-10-CM

## 2023-05-10 LAB
EXPIRATION DATE: ABNORMAL
EXPIRATION DATE: NORMAL
GLUCOSE BLDC GLUCOMTR-MCNC: 157 MG/DL (ref 70–130)
HBA1C MFR BLD: 7.8 %
Lab: ABNORMAL
Lab: NORMAL

## 2023-05-10 NOTE — PATIENT INSTRUCTIONS
If ozempic is approved by patient assistance you will start 0.25 mg weekly for 4 weeks then increase to 0.5 mg weekly and stay on that dose

## 2023-05-10 NOTE — PROGRESS NOTES
Chief Complaint  F/u for Diabetes Mellitus.    HPI   Neno Green is a 73 y.o. male with afib on multaq and xarelto, who is here today for f/u of Diabetes Mellitus type 2. The initial diagnosis of diabetes was made at age 48.       Diabetic complications: none  Eye exam current (within one year): updated 5/2023 ([facility]) - macular degeneration     Current diabetic medications include:  Novolog, on insulin pump  Farxiga - pt assistance    Statin: pravastatin 40, tricor    Past medications: metformin (change to farxiga)     Diabetic Monitoring  -   Medtronic pump downloaded and reviewed-63% basal, 37% bolus, entering blood sugars and carbs 1-3 times per day, blood sugars range 130-160s without hypoglycemia    The following portions of the patient's history were reviewed and updated by me as appropriate: allergies, current medications, past family history, past social history, past surgical history and problem list.    Past Medical History:   Diagnosis Date   • Acid reflux    • Arthritis    • Asthma    • Depression    • Heart attack    • Heart disease    • Hyperlipidemia    • Hypertension    • Migraine    • Testosterone deficiency ??   • Type 2 diabetes mellitus        Medications    Current Outpatient Medications:   •  Blood Glucose Monitoring Suppl (TRUE METRIX AIR GLUCOSE METER) w/Device kit, , Disp: , Rfl:   •  carvedilol (COREG) 6.25 MG tablet, Take 1 tablet by mouth 2 (Two) Times a Day With Meals., Disp: , Rfl:   •  dapagliflozin Propanediol (Farxiga) 10 MG tablet, Take 10 mg by mouth Daily., Disp: 30 tablet, Rfl: 11  •  fenofibrate (TRICOR) 145 MG tablet, Take 1 tablet by mouth Daily., Disp: , Rfl:   •  fluticasone (FLONASE) 50 MCG/ACT nasal spray, SHAKE LQ AND U 2 SPRAYS IEN D, Disp: , Rfl: 3  •  Insulin Infusion Pump (MiniMed 670G Insulin Pump) device, , Disp: , Rfl:   •  isosorbide mononitrate (IMDUR) 30 MG 24 hr tablet, Take 1 tablet by mouth Daily., Disp: , Rfl:   •  losartan (COZAAR) 50 MG tablet,  "Take 1 tablet by mouth 2 (Two) Times a Day., Disp: , Rfl:   •  MULTAQ 400 MG tablet, Take 1 tablet by mouth 2 (Two) Times a Day With Meals., Disp: , Rfl:   •  nitroglycerin (NITROSTAT) 0.4 MG SL tablet, Place 1 tablet under the tongue As Needed., Disp: , Rfl:   •  NOVOLOG 100 UNIT/ML injection, MDD of 20 units Via pump daily, Disp: , Rfl:   •  omeprazole (priLOSEC) 40 MG capsule, Take 1 capsule by mouth As Needed., Disp: , Rfl:   •  pravastatin (PRAVACHOL) 40 MG tablet, Take 20 mg by mouth Daily., Disp: , Rfl:   •  TRUE METRIX BLOOD GLUCOSE TEST test strip, Use to check glucose daily, Disp: , Rfl:   •  TRUEPLUS LANCETS 33G misc, Use to check glucose daily., Disp: , Rfl:   •  XARELTO 20 MG tablet, Take  by mouth Daily., Disp: , Rfl: 2    Review of Systems  Review of Systems   All other systems reviewed and are negative.       Physical Exam    /70   Pulse 67   Ht 177.8 cm (70\")   Wt 94.8 kg (209 lb)   SpO2 98%   BMI 29.99 kg/m² Body mass index is 29.99 kg/m².  Physical Exam   Constitutional: He is oriented to person, place, and time. He appears well-developed. No distress.   HENT:   Head: Normocephalic.   Right Ear: External ear normal.   Left Ear: External ear normal.   Nose: Nose normal.   Eyes: Conjunctivae are normal. Right eye exhibits no discharge. Left eye exhibits no discharge. No scleral icterus.   Neck: No JVD present. No tracheal deviation present. No thyromegaly present.   Cardiovascular: Normal rate, regular rhythm and normal heart sounds.   No murmur heard.  Pulmonary/Chest: Effort normal and breath sounds normal. No respiratory distress. He has no wheezes.   Musculoskeletal: No tenderness.   Neurological: He is alert and oriented to person, place, and time.   Skin: Skin is warm and dry. No rash noted. He is not diaphoretic. No erythema.   Psychiatric: His behavior is normal. Judgment and thought content normal.       Labs and Imaging   Lab Results   Component Value Date    HGBA1C 7.8 " 05/10/2023    HGBA1C 7.0 09/28/2022    HGBA1C 7.0 05/19/2022     Office Visit on 05/10/2023   Component Date Value Ref Range Status   • Hemoglobin A1C 05/10/2023 7.8  % Final   • Lot Number 05/10/2023 [id number]   Final   • Expiration Date 05/10/2023 01-   Final   • Glucose 05/10/2023 157 (A)  70 - 130 mg/dL Final   • Lot Number 05/10/2023 [id number]   Final   • Expiration Date 05/10/2023 11-   Final     External labs reviewed:  1/12/2023: A1c 6.9, TSH 3.24, triglycerides 93, LDL 45, creatinine 1.34, GFR 55, AST 19, ALT 15, hemoglobin 13.1, hematocrit 39.4  9/15/2022: LDL 66, triglycerides 172, creatinine 1.42, GFR 52, ALT 16, AST 20, hemoglobin 13.4, hematocrit 41, A1c 7, TSH 2.25, microalbumin/creatinine ratio 3    Assessment / Plan   Diagnoses and all orders for this visit:    1. Type 2 diabetes mellitus without complication, with long-term current use of insulin (Primary)  -     POC Glycosylated Hemoglobin (Hb A1C)  -     POC Glucose, Blood    2. Mixed hyperlipidemia        Diabetes Mellitus 2 is under good control   -A1c 7.8, up from 6.9 1/12/2023  -He denies frequent hypoglycemia  -no pump setting adjustments  -continue farxiga  -will apply for ozempic pt assistance to add cv benefits. Risks and side effects reviewed.   -foot exam updated 1/2023, labs were done 1/2023, urine albumin due 9/2023, eye exam is utd     Hyperlipidemia  -lipid panel utd 1/2023- LDL within guidelines  -continue pravastatin 40 mg qhs    Patient Instructions   If ozempic is approved by patient assistance you will start 0.25 mg weekly for 4 weeks then increase to 0.5 mg weekly and stay on that dose      Follow up: Return in about 3 months (around 8/10/2023).       Trevor Jang PA-C

## 2023-05-10 NOTE — LETTER
May 10, 2023    Neno Green  [address] [address]      If ozempic is approved by patient assistance you will start 0.25 mg weekly for 4 weeks then increase to 0.5 mg weekly and stay on that dose                          Trevor Jang PA-C

## 2023-05-11 ENCOUNTER — TELEPHONE (OUTPATIENT)
Dept: ENDOCRINOLOGY | Facility: CLINIC | Age: 73
End: 2023-05-11
Payer: MEDICARE

## 2023-05-11 NOTE — TELEPHONE ENCOUNTER
"Called and spoke with patient about needing his financial information brought in to complete the Ozempic PAP application. He stated he would be able to bring it in next week. I advised I will be out of the office starting Tuesday but someone will make a copy for him.     Pt application is going to be placed in the \"to-do\" folder on desk.   "

## 2023-06-01 ENCOUNTER — TELEPHONE (OUTPATIENT)
Dept: ENDOCRINOLOGY | Facility: CLINIC | Age: 73
End: 2023-06-01

## 2023-06-01 NOTE — TELEPHONE ENCOUNTER
CALLED AND SPOKE WITH PATIENT TO LET HIM KNOW HIS PT ASSISTANCE WAS APPROVED FOR OZEMPIC AND SHE BE AT THE OFFICE WITHIN THE NEXT TWO WEEKS. HE VOICED UNDERSTANDING.

## 2023-06-09 ENCOUNTER — TELEPHONE (OUTPATIENT)
Dept: ENDOCRINOLOGY | Facility: CLINIC | Age: 73
End: 2023-06-09
Payer: MEDICARE

## 2023-06-12 ENCOUNTER — TELEPHONE (OUTPATIENT)
Dept: ENDOCRINOLOGY | Facility: CLINIC | Age: 73
End: 2023-06-12
Payer: MEDICARE

## 2023-06-12 NOTE — TELEPHONE ENCOUNTER
Please call pt  [phone]  Pt picked up his ozempic and wanted to know how takes it please call him back

## 2023-09-08 ENCOUNTER — TELEPHONE (OUTPATIENT)
Dept: ENDOCRINOLOGY | Facility: CLINIC | Age: 73
End: 2023-09-08
Payer: MEDICARE

## 2023-09-08 NOTE — TELEPHONE ENCOUNTER
Patient returned my call  He was almost rude on the phone as he communicated to me about the topical vs  injectable TRT options  Patient was getting the injectable option "for FREE" but with the topical agent, he needs to pay $80   He was very clear about not wanting the topical agent  Message forwarded back to the Advanced Practitioner treating this patient  Spoke with patient.  Informed patient that patient assistance for Ozempic is available for .  Patient stated that he would  medication Monday Sept 11th.

## 2023-09-20 ENCOUNTER — OFFICE VISIT (OUTPATIENT)
Dept: ENDOCRINOLOGY | Facility: CLINIC | Age: 73
End: 2023-09-20
Payer: MEDICARE

## 2023-09-20 ENCOUNTER — TELEPHONE (OUTPATIENT)
Dept: ENDOCRINOLOGY | Facility: CLINIC | Age: 73
End: 2023-09-20

## 2023-09-20 VITALS
BODY MASS INDEX: 28.92 KG/M2 | OXYGEN SATURATION: 98 % | SYSTOLIC BLOOD PRESSURE: 108 MMHG | HEART RATE: 69 BPM | HEIGHT: 70 IN | DIASTOLIC BLOOD PRESSURE: 70 MMHG | WEIGHT: 202 LBS

## 2023-09-20 DIAGNOSIS — E11.9 TYPE 2 DIABETES MELLITUS WITHOUT COMPLICATION, WITH LONG-TERM CURRENT USE OF INSULIN: Primary | Chronic | ICD-10-CM

## 2023-09-20 DIAGNOSIS — Z79.4 TYPE 2 DIABETES MELLITUS WITHOUT COMPLICATION, WITH LONG-TERM CURRENT USE OF INSULIN: Primary | Chronic | ICD-10-CM

## 2023-09-20 DIAGNOSIS — E78.2 MIXED HYPERLIPIDEMIA: Chronic | ICD-10-CM

## 2023-09-20 LAB
EXPIRATION DATE: ABNORMAL
GLUCOSE BLDC GLUCOMTR-MCNC: 144 MG/DL (ref 70–130)
Lab: ABNORMAL

## 2023-09-20 RX ORDER — AMIODARONE HYDROCHLORIDE 400 MG/1
1 TABLET ORAL EVERY 12 HOURS SCHEDULED
COMMUNITY
Start: 2023-09-18

## 2023-09-20 RX ORDER — SEMAGLUTIDE 0.68 MG/ML
0.5 INJECTION, SOLUTION SUBCUTANEOUS WEEKLY
COMMUNITY

## 2023-09-20 NOTE — PROGRESS NOTES
Chief Complaint  F/u for Diabetes Mellitus.    HPI   Neno Green is a 73 y.o. male with afib on amiodarone and xarelto, who is here today for f/u of Diabetes Mellitus type 2. The initial diagnosis of diabetes was made at age 48.     He has lost about 10 pounds in the last year.  Tolerating Ozempic well. It keep his BG controlled.   He reports lowest BG has been in the 90s.     Diabetic complications: none  Eye exam current (within one year): updated 5/2023 ([facility]) - macular degeneration     Current diabetic medications include:  Novolog, on insulin pump  Farxiga - pt assistance  Ozempic 0.5 mg weekly - pt assistance    Statin: pravastatin 40, tricor    Past medications: metformin (change to farxiga)     Diabetic Monitoring  -   Pee[employer] Mobile Digital pump downloaded and reviewed- 69% basal, 31% bolus, entering blood sugars and carbs 1-3 times per day, blood sugars range  without hypoglycemia    The following portions of the patient's history were reviewed and updated by me as appropriate: allergies, current medications, past family history, past social history, past surgical history and problem list.    Past Medical History:   Diagnosis Date    Acid reflux     Arthritis     Asthma     Depression     Heart attack     Heart disease     Hyperlipidemia     Hypertension     Migraine     Testosterone deficiency ??    Type 2 diabetes mellitus        Medications    Current Outpatient Medications:     amiodarone (PACERONE) 400 MG tablet, Take 1 tablet by mouth Every 12 (Twelve) Hours., Disp: , Rfl:     Blood Glucose Monitoring Suppl (TRUE METRIX AIR GLUCOSE METER) w/Device kit, , Disp: , Rfl:     carvedilol (COREG) 6.25 MG tablet, Take 1 tablet by mouth 2 (Two) Times a Day With Meals., Disp: , Rfl:     dapagliflozin Propanediol (Farxiga) 10 MG tablet, Take 10 mg by mouth Daily., Disp: 30 tablet, Rfl: 11    fenofibrate (TRICOR) 145 MG tablet, Take 1 tablet by mouth Daily., Disp: , Rfl:     fluticasone (FLONASE) 50 MCG/ACT  "nasal spray, SHAKE LQ AND U 2 SPRAYS IEN D, Disp: , Rfl: 3    Insulin Infusion Pump (MiniMed 670G Insulin Pump) device, , Disp: , Rfl:     isosorbide mononitrate (IMDUR) 30 MG 24 hr tablet, Take 1 tablet by mouth Daily., Disp: , Rfl:     losartan (COZAAR) 50 MG tablet, Take 1 tablet by mouth 2 (Two) Times a Day., Disp: , Rfl:     nitroglycerin (NITROSTAT) 0.4 MG SL tablet, Place 1 tablet under the tongue As Needed., Disp: , Rfl:     NOVOLOG 100 UNIT/ML injection, MDD of 20 units Via pump daily, Disp: , Rfl:     omeprazole (priLOSEC) 40 MG capsule, Take 1 capsule by mouth As Needed., Disp: , Rfl:     pravastatin (PRAVACHOL) 40 MG tablet, Take 0.5 tablets by mouth Daily., Disp: , Rfl:     Semaglutide,0.25 or 0.5MG/DOS, (Ozempic, 0.25 or 0.5 MG/DOSE,) 2 MG/3ML solution pen-injector, Inject 0.5 mg under the skin into the appropriate area as directed 1 (One) Time Per Week., Disp: , Rfl:     TRUE METRIX BLOOD GLUCOSE TEST test strip, Use to check glucose daily, Disp: , Rfl:     TRUEPLUS LANCETS 33G misc, Use to check glucose daily., Disp: , Rfl:     XARELTO 20 MG tablet, Take  by mouth Daily., Disp: , Rfl: 2    Review of Systems  Review of Systems   All other systems reviewed and are negative.     Physical Exam    /70   Pulse 69   Ht 177.8 cm (70\")   Wt 91.6 kg (202 lb)   SpO2 98%   BMI 28.98 kg/m² Body mass index is 28.98 kg/m².  Physical Exam   Constitutional: He is oriented to person, place, and time. He appears well-developed. No distress.   HENT:   Head: Normocephalic.   Right Ear: External ear normal.   Left Ear: External ear normal.   Nose: Nose normal.   Eyes: Conjunctivae are normal. Right eye exhibits no discharge. Left eye exhibits no discharge. No scleral icterus.   Neck: No JVD present. No tracheal deviation present. No thyromegaly present.   Cardiovascular: Normal rate, regular rhythm and normal heart sounds.   No murmur heard.  Pulmonary/Chest: Effort normal and breath sounds normal. No " respiratory distress. He has no wheezes.   Musculoskeletal: No tenderness.   Neurological: He is alert and oriented to person, place, and time.   Skin: Skin is warm and dry. No rash noted. He is not diaphoretic. No erythema.   Psychiatric: His behavior is normal. Judgment and thought content normal.     Labs and Imaging   Lab Results   Component Value Date    HGBA1C 7.8 05/10/2023    HGBA1C 7.0 09/28/2022    HGBA1C 7.0 05/19/2022     Office Visit on 09/20/2023   Component Date Value Ref Range Status    Glucose 09/20/2023 144 (A)  70 - 130 mg/dL Final    Lot Number 09/20/2023 [id number]   Final    Expiration Date 09/20/2023 05-   Final     Assessment / Plan   Diagnoses and all orders for this visit:    1. Type 2 diabetes mellitus without complication, with long-term current use of insulin (Primary)  -     Cancel: POC Glycosylated Hemoglobin (Hb A1C)  -     POC Glucose, Blood  -     Microalbumin / Creatinine Urine Ratio - Urine, Clean Catch    2. Mixed hyperlipidemia        Diabetes Mellitus 2 is under good control   -Patient reports he had an A1c (6.4) done with PCP in July so too early to repeat today, will obtain records  -  -He denies hypoglycemia - BG readings on pump range from  and he denies symptoms of hypoglycemia  -no pump setting adjustments  -continue farxiga  -Continue Ozempic 0.5 mg weekly  -he declines a CGM  -foot exam updated 1/2023, labs were done with PCP 7/2023 and we will obtain records, check UACR today, eye exam is utd     Hyperlipidemia  -Will obtain recent labs from PCP  -continue pravastatin 40 mg qhs    There are no Patient Instructions on file for this visit.      Follow up: Return in about 4 months (around 1/20/2024).       Trevor Jang PA-C

## 2023-09-20 NOTE — TELEPHONE ENCOUNTER
----- Message from Trevor Jang PA-C sent at 9/20/2023 12:14 PM EDT -----  Regarding: labs  Please obtain records from pcp - lipids, tsh, cmp

## 2023-09-21 LAB
ALBUMIN/CREAT UR: <7 MG/G CREAT (ref 0–29)
CREAT UR-MCNC: 41.6 MG/DL
MICROALBUMIN UR-MCNC: <3 UG/ML

## 2023-10-30 ENCOUNTER — TELEPHONE (OUTPATIENT)
Dept: ENDOCRINOLOGY | Facility: CLINIC | Age: 73
End: 2023-10-30

## 2023-10-30 RX ORDER — SEMAGLUTIDE 1.34 MG/ML
1 INJECTION, SOLUTION SUBCUTANEOUS WEEKLY
Qty: 9 ML | Refills: 3 | Status: SHIPPED | OUTPATIENT
Start: 2023-10-30

## 2023-10-30 NOTE — TELEPHONE ENCOUNTER
Patient would like to increase dose of his Ozempic. If appropriate let me know and I will change the re-order form to the 1mg Ozempic for NovoNordisk. Also, I advised the patient we will need to re-submit the Ozempic pt assistance application for this coming year for the 1 mg if [place] approved. He voiced understanding.

## 2023-10-30 NOTE — TELEPHONE ENCOUNTER
Caller: CORINNE PAL    Relationship to patient: SELF    Best call back number: [phone]    Patient is needing: PATIENT IS NEEDING A CALL BACK ABOUT UPPING HIS DOSAGE ON OZEMPIC. PLEASE ADVISE

## 2023-12-06 ENCOUNTER — TELEPHONE (OUTPATIENT)
Dept: ENDOCRINOLOGY | Facility: CLINIC | Age: 73
End: 2023-12-06
Payer: MEDICARE

## 2023-12-06 NOTE — TELEPHONE ENCOUNTER
Left message ozempic pt assistance is here.  It is the lower dose. Looks like he can increase his dose to 1mg

## 2023-12-11 ENCOUNTER — TELEPHONE (OUTPATIENT)
Dept: ENDOCRINOLOGY | Facility: CLINIC | Age: 73
End: 2023-12-11

## 2023-12-11 DIAGNOSIS — E11.9 TYPE 2 DIABETES MELLITUS WITHOUT COMPLICATION, WITH LONG-TERM CURRENT USE OF INSULIN: Primary | ICD-10-CM

## 2023-12-11 DIAGNOSIS — Z79.4 TYPE 2 DIABETES MELLITUS WITHOUT COMPLICATION, WITH LONG-TERM CURRENT USE OF INSULIN: Primary | ICD-10-CM

## 2023-12-11 RX ORDER — SEMAGLUTIDE 0.68 MG/ML
0.5 INJECTION, SOLUTION SUBCUTANEOUS WEEKLY
Qty: 9 ML | Refills: 3 | Status: SHIPPED | OUTPATIENT
Start: 2023-12-11

## 2023-12-11 NOTE — TELEPHONE ENCOUNTER
Ok to do 0.5 mg, instead of going up to 1 mg. Rx printed. Would increase increase water and fiber intake.

## 2023-12-11 NOTE — TELEPHONE ENCOUNTER
Spoke with patient and he wants to stay on the 0.5 mg dose of Ozempic because he is nervous about having the constipation again but is taking stool softener QD. Advised that I would let Trevor know and he will need to contact [email or web address] to have them send us a dosage change form because the 2024 application was for 1 mg. He voiced understanding and will be waiting for a call back to let him know if Trevor is okay with the dose decrease.

## 2023-12-11 NOTE — TELEPHONE ENCOUNTER
PT CAME INTO THE OFFICE TO PICKUP OZEMPIC PT ASSISTANCE. HE STATED HE WAS HOSPITALIZED AT [facility] FOR CONSTIPATION. HE STATED HE IS NOW TAKING STOOL SOFTENERS. PT STATED HE WOULD LIKE TO STAY ON THE OZEMPIC 0.5MG DOSE. HE REQUESTED A CALL BACK TO CONSULT.

## 2024-01-18 ENCOUNTER — TELEPHONE (OUTPATIENT)
Dept: ENDOCRINOLOGY | Facility: CLINIC | Age: 74
End: 2024-01-18
Payer: MEDICARE

## 2024-01-25 ENCOUNTER — TELEPHONE (OUTPATIENT)
Dept: ENDOCRINOLOGY | Facility: CLINIC | Age: 74
End: 2024-01-25
Payer: MEDICARE

## 2024-01-25 NOTE — TELEPHONE ENCOUNTER
PT CAME IN TO  OZEMPIC PT ASSISTANCE. HE STATED HE HAS BEEN CONSTIPATED PERIODICALLY SINCE TAKING THE MEDICATION. HE REQUESTED A CALL BACK TO CONSULT.

## 2024-01-26 NOTE — TELEPHONE ENCOUNTER
He can try decreasing to 0.25 mg weekly. Increase fluid and fiber intake, increase activity. If still bothersome then we may need to stop Ozempic.

## 2024-02-12 ENCOUNTER — OFFICE VISIT (OUTPATIENT)
Dept: ENDOCRINOLOGY | Facility: CLINIC | Age: 74
End: 2024-02-12
Payer: MEDICARE

## 2024-02-12 VITALS
OXYGEN SATURATION: 98 % | HEART RATE: 72 BPM | HEIGHT: 70 IN | BODY MASS INDEX: 29.06 KG/M2 | WEIGHT: 203 LBS | SYSTOLIC BLOOD PRESSURE: 142 MMHG | DIASTOLIC BLOOD PRESSURE: 76 MMHG

## 2024-02-12 DIAGNOSIS — Z79.4 TYPE 2 DIABETES MELLITUS WITHOUT COMPLICATION, WITH LONG-TERM CURRENT USE OF INSULIN: Primary | Chronic | ICD-10-CM

## 2024-02-12 DIAGNOSIS — E78.2 MIXED HYPERLIPIDEMIA: Chronic | ICD-10-CM

## 2024-02-12 DIAGNOSIS — E11.9 TYPE 2 DIABETES MELLITUS WITHOUT COMPLICATION, WITH LONG-TERM CURRENT USE OF INSULIN: Primary | Chronic | ICD-10-CM

## 2024-02-12 LAB
EXPIRATION DATE: ABNORMAL
EXPIRATION DATE: NORMAL
GLUCOSE BLDC GLUCOMTR-MCNC: 93 MG/DL (ref 70–130)
HBA1C MFR BLD: 6.3 % (ref 4.5–5.7)
Lab: ABNORMAL
Lab: NORMAL

## 2024-02-12 PROCEDURE — 1159F MED LIST DOCD IN RCRD: CPT | Performed by: PHYSICIAN ASSISTANT

## 2024-02-12 PROCEDURE — 3044F HG A1C LEVEL LT 7.0%: CPT | Performed by: PHYSICIAN ASSISTANT

## 2024-02-12 PROCEDURE — 82947 ASSAY GLUCOSE BLOOD QUANT: CPT | Performed by: PHYSICIAN ASSISTANT

## 2024-02-12 PROCEDURE — 83036 HEMOGLOBIN GLYCOSYLATED A1C: CPT | Performed by: PHYSICIAN ASSISTANT

## 2024-02-12 PROCEDURE — 99214 OFFICE O/P EST MOD 30 MIN: CPT | Performed by: PHYSICIAN ASSISTANT

## 2024-02-12 PROCEDURE — 1160F RVW MEDS BY RX/DR IN RCRD: CPT | Performed by: PHYSICIAN ASSISTANT

## 2024-02-12 RX ORDER — DOCUSATE SODIUM 100 MG/1
1 CAPSULE, LIQUID FILLED ORAL DAILY
COMMUNITY
Start: 2023-11-24

## 2024-02-12 RX ORDER — INSULIN LISPRO 100 [IU]/ML
INJECTION, SOLUTION INTRAVENOUS; SUBCUTANEOUS
COMMUNITY

## 2024-02-12 RX ORDER — LEVOTHYROXINE SODIUM 0.05 MG/1
50 TABLET ORAL DAILY
COMMUNITY
Start: 2024-01-15

## 2024-04-18 ENCOUNTER — TELEPHONE (OUTPATIENT)
Dept: ENDOCRINOLOGY | Facility: CLINIC | Age: 74
End: 2024-04-18
Payer: MEDICARE

## 2024-07-24 ENCOUNTER — TELEPHONE (OUTPATIENT)
Dept: ENDOCRINOLOGY | Facility: CLINIC | Age: 74
End: 2024-07-24
Payer: MEDICARE

## 2024-08-12 ENCOUNTER — OFFICE VISIT (OUTPATIENT)
Dept: ENDOCRINOLOGY | Facility: CLINIC | Age: 74
End: 2024-08-12
Payer: MEDICARE

## 2024-08-12 VITALS
HEIGHT: 70 IN | OXYGEN SATURATION: 94 % | HEART RATE: 46 BPM | BODY MASS INDEX: 28.95 KG/M2 | TEMPERATURE: 97.8 F | SYSTOLIC BLOOD PRESSURE: 126 MMHG | RESPIRATION RATE: 16 BRPM | DIASTOLIC BLOOD PRESSURE: 78 MMHG | WEIGHT: 202.2 LBS

## 2024-08-12 DIAGNOSIS — Z79.4 TYPE 2 DIABETES MELLITUS WITHOUT COMPLICATION, WITH LONG-TERM CURRENT USE OF INSULIN: Primary | Chronic | ICD-10-CM

## 2024-08-12 DIAGNOSIS — E11.9 TYPE 2 DIABETES MELLITUS WITHOUT COMPLICATION, WITH LONG-TERM CURRENT USE OF INSULIN: Primary | Chronic | ICD-10-CM

## 2024-08-12 DIAGNOSIS — G47.33 OBSTRUCTIVE SLEEP APNEA SYNDROME: ICD-10-CM

## 2024-08-12 DIAGNOSIS — R00.1 BRADYCARDIA: ICD-10-CM

## 2024-08-12 LAB
EXPIRATION DATE: ABNORMAL
EXPIRATION DATE: NORMAL
GLUCOSE BLDC GLUCOMTR-MCNC: 78 MG/DL (ref 70–130)
HBA1C MFR BLD: 6.7 % (ref 4.5–5.7)
Lab: ABNORMAL
Lab: NORMAL

## 2024-08-12 PROCEDURE — 1160F RVW MEDS BY RX/DR IN RCRD: CPT | Performed by: PHYSICIAN ASSISTANT

## 2024-08-12 PROCEDURE — 1159F MED LIST DOCD IN RCRD: CPT | Performed by: PHYSICIAN ASSISTANT

## 2024-08-12 PROCEDURE — 99214 OFFICE O/P EST MOD 30 MIN: CPT | Performed by: PHYSICIAN ASSISTANT

## 2024-08-12 PROCEDURE — 83036 HEMOGLOBIN GLYCOSYLATED A1C: CPT | Performed by: PHYSICIAN ASSISTANT

## 2024-08-12 PROCEDURE — 82947 ASSAY GLUCOSE BLOOD QUANT: CPT | Performed by: PHYSICIAN ASSISTANT

## 2024-08-12 PROCEDURE — 3044F HG A1C LEVEL LT 7.0%: CPT | Performed by: PHYSICIAN ASSISTANT

## 2024-08-12 RX ORDER — LORATADINE 10 MG/1
TABLET ORAL
COMMUNITY

## 2024-08-12 RX ORDER — AMPICILLIN TRIHYDRATE 250 MG
CAPSULE ORAL
COMMUNITY

## 2024-08-12 NOTE — PROGRESS NOTES
Chief Complaint  F/u for Diabetes Mellitus.    HPI   Neno Green is a 74 y.o. male with afib on amiodarone and xarelto, who is here today for f/u of Diabetes Mellitus type 2. The initial diagnosis of diabetes was made at age 48.     HR 46 - recheck 60-61. Patient is asymptomatic. He is on carvedilol, prescribed by pcp. Reports HR is typically in the 60s, sometimes goes down into the 50s.     He has longstanding sleep apnea.  Needs referral to sleep medicine for reevaluation.    BG 79 at visit today - he drank juice at appt.     Diabetic complications: none  Eye exam current (within one year): updated 5/2023 ([facility]) - macular degeneration     Current diabetic medications include:  Humalog, on insulin pump  Farxiga - pt assistance  Ozempic 0.5 mg weekly - pt assistance    Statin: pravastatin 40, tricor    Past medications: metformin (change to farxiga)     Diabetic Monitoring  -   [employer] pump downloaded and reviewed (7/30/2024 to 8/12/2024)- 53% basal, 47% bolus, entering blood sugars and carbs 1-3 times per day, blood sugars range 105-174 without hypoglycemia    He is on levothyroxine 50 mcg daily for hypothyroidism    The following portions of the patient's history were reviewed and updated by me as appropriate: allergies, current medications, past family history, past social history, past surgical history and problem list.    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    Type 2 diabetes mellitus        Medications    Current Outpatient Medications:     amiodarone (PACERONE) 400 MG tablet, Take 1 tablet by mouth Every 12 (Twelve) Hours., Disp: , Rfl:     Blood Glucose Monitoring Suppl (TRUE METRIX AIR GLUCOSE METER) w/Device kit, , Disp: , Rfl:     carvedilol (COREG) 6.25 MG tablet, Take 1 tablet by mouth 2  (Two) Times a Day With Meals., Disp: , Rfl:     Cinnamon 500 MG capsule, Take  by mouth., Disp: , Rfl:     dapagliflozin Propanediol (Farxiga) 10 MG tablet, Take 10 mg by mouth Daily., Disp: 30 tablet, Rfl: 11    docusate sodium (COLACE) 100 MG capsule, Take 1 capsule by mouth Daily., Disp: , Rfl:     fenofibrate (TRICOR) 145 MG tablet, Take 1 tablet by mouth Daily., Disp: , Rfl:     fluticasone (FLONASE) 50 MCG/ACT nasal spray, SHAKE LQ AND U 2 SPRAYS IEN D, Disp: , Rfl: 3    Insulin Infusion Pump (MiniMed 670G Insulin Pump) device, , Disp: , Rfl:     Insulin Lispro (HumaLOG) 100 UNIT/ML injection, MDD of 20 units Via pump daily, Disp: , Rfl:     isosorbide mononitrate (IMDUR) 30 MG 24 hr tablet, Take 1 tablet by mouth Daily., Disp: , Rfl:     levothyroxine (SYNTHROID, LEVOTHROID) 50 MCG tablet, Take 1 tablet by mouth Daily., Disp: , Rfl:     loratadine (CLARITIN) 10 MG tablet, Take  by mouth., Disp: , Rfl:     losartan (COZAAR) 50 MG tablet, Take 1 tablet by mouth 2 (Two) Times a Day., Disp: , Rfl:     metFORMIN (GLUCOPHAGE) 500 MG tablet, Take 1 tablet by mouth 2 (Two) Times a Day., Disp: , Rfl:     nitroglycerin (NITROSTAT) 0.4 MG SL tablet, Place 1 tablet under the tongue As Needed., Disp: , Rfl:     omeprazole (priLOSEC) 40 MG capsule, Take 1 capsule by mouth As Needed., Disp: , Rfl:     pravastatin (PRAVACHOL) 40 MG tablet, Take 0.5 tablets by mouth Daily., Disp: , Rfl:     Semaglutide,0.25 or 0.5MG/DOS, (Ozempic, 0.25 or 0.5 MG/DOSE,) 2 MG/3ML solution pen-injector, Inject 0.5 mg under the skin into the appropriate area as directed 1 (One) Time Per Week., Disp: 9 mL, Rfl: 3    TRUE METRIX BLOOD GLUCOSE TEST test strip, Use to check glucose daily, Disp: , Rfl:     TRUEPLUS LANCETS 33G misc, Use to check glucose daily., Disp: , Rfl:     XARELTO 20 MG tablet, Take  by mouth Daily., Disp: , Rfl: 2    Review of Systems  Review of Systems   All other systems reviewed and are negative.       Physical Exam    BP  "126/78 (BP Location: Left arm, Patient Position: Sitting, Cuff Size: Adult)   Pulse (!) 46   Temp 97.8 °F (36.6 °C) (Temporal)   Resp 16   Ht 177.8 cm (70\")   Wt 91.7 kg (202 lb 3.2 oz)   SpO2 94%   BMI 29.01 kg/m² Body mass index is 29.01 kg/m².  Physical Exam   Constitutional: He is oriented to person, place, and time. He appears well-developed. No distress.   HENT:   Head: Normocephalic.   Right Ear: External ear normal.   Left Ear: External ear normal.   Nose: Nose normal.   Eyes: Conjunctivae are normal. Right eye exhibits no discharge. Left eye exhibits no discharge. No scleral icterus.   Neck: No JVD present. No tracheal deviation present. No thyromegaly present.   Cardiovascular: Normal rate, regular rhythm and normal heart sounds.   No murmur heard.  Pulmonary/Chest: Effort normal and breath sounds normal. No respiratory distress. He has no wheezes.   Musculoskeletal: No tenderness.   Neurological: He is alert and oriented to person, place, and time.   Skin: Skin is warm and dry. No rash noted. He is not diaphoretic. No erythema.   Psychiatric: His behavior is normal. Judgment and thought content normal.       Labs and Imaging   Lab Results   Component Value Date    HGBA1C 6.7 (A) 08/12/2024    HGBA1C 6.3 (A) 02/12/2024    HGBA1C 7.8 05/10/2023     Office Visit on 08/12/2024   Component Date Value Ref Range Status    Hemoglobin A1C 08/12/2024 6.7 (A)  4.5 - 5.7 % Final    Lot Number 08/12/2024 [id number]   Final    Expiration Date 08/12/2024 4/26/26   Final    Glucose 08/12/2024 78  70 - 130 mg/dL Final    Lot Number 08/12/2024 [id number]   Final    Expiration Date 08/12/2024 1/20/25   Final     Assessment / Plan   Diagnoses and all orders for this visit:    1. Type 2 diabetes mellitus without complication, with long-term current use of insulin (Primary)  -     POC Glycosylated Hemoglobin (Hb A1C)  -     POC Glucose, Blood    2. Obstructive sleep apnea syndrome  -     Ambulatory Referral to Sleep " Medicine    3. Bradycardia          Diabetes Mellitus 2 is under good control   -A1c 6.7  -He denies hypoglycemia   -no pump setting adjustments  -continue farxiga  -Continue Ozempic 0.5 mg weekly - he gets this through patient assistance   -historically declined a CGM  -he is likely due for pump upgrade, will have Norma with [employer] reach out to him.  He may consider upgrading but depends on cost.  -foot exam updated 2/2024, labs updated a month ago, eye exam is utd    Bradycardia  -46 heart rate initially obtained was not accurate  -Repeat heart rate was 60-61  -Patient asymptomatic  -He will discuss with primary care if it heart rate does drop in the 50s at times    There are no Patient Instructions on file for this visit.      Follow up: Return in about 6 months (around 2/12/2025).       Trevor Jang PA-C

## 2024-08-13 ENCOUNTER — TELEPHONE (OUTPATIENT)
Dept: ENDOCRINOLOGY | Facility: CLINIC | Age: 74
End: 2024-08-13
Payer: MEDICARE

## 2024-08-13 NOTE — TELEPHONE ENCOUNTER
----- Message from Trevor Jang sent at 8/12/2024 11:57 AM EDT -----  Regarding: Labs  Please obtain labs from PCP -CMP, CBC, lipid, TSH, microalbumin

## 2024-08-19 ENCOUNTER — TELEPHONE (OUTPATIENT)
Dept: ENDOCRINOLOGY | Facility: CLINIC | Age: 74
End: 2024-08-19

## 2024-10-14 ENCOUNTER — TELEPHONE (OUTPATIENT)
Dept: ENDOCRINOLOGY | Facility: CLINIC | Age: 74
End: 2024-10-14
Payer: MEDICARE

## 2024-10-14 NOTE — TELEPHONE ENCOUNTER
PATIENT MET WITH [facility] LAST WEEK AND HAD HIS NEW PUMP SET UP, HE IS NEEDING TO KNOW IF THE OLD PUMP NEEDS TO BE REVIEWED BY OUR OFFICE BEFORE HE SENDS HIS OLD PUMP BACK. PHONE NUMBER [phone]

## 2024-10-14 NOTE — TELEPHONE ENCOUNTER
Called the pt and told him we do not need his old pump to download.    He verbalized understanding.

## 2024-11-09 ENCOUNTER — OUTSIDE FACILITY SERVICE (OUTPATIENT)
Dept: CARDIOLOGY | Facility: CLINIC | Age: 74
End: 2024-11-09
Payer: MEDICARE

## 2024-11-09 PROCEDURE — 99232 SBSQ HOSP IP/OBS MODERATE 35: CPT | Performed by: INTERNAL MEDICINE

## 2024-11-10 ENCOUNTER — OUTSIDE FACILITY SERVICE (OUTPATIENT)
Dept: CARDIOLOGY | Facility: CLINIC | Age: 74
End: 2024-11-10
Payer: MEDICARE

## 2024-11-10 PROCEDURE — 99232 SBSQ HOSP IP/OBS MODERATE 35: CPT | Performed by: INTERNAL MEDICINE

## 2025-01-13 DIAGNOSIS — Z79.4 TYPE 2 DIABETES MELLITUS WITHOUT COMPLICATION, WITH LONG-TERM CURRENT USE OF INSULIN: Chronic | ICD-10-CM

## 2025-01-13 DIAGNOSIS — E11.9 TYPE 2 DIABETES MELLITUS WITHOUT COMPLICATION, WITH LONG-TERM CURRENT USE OF INSULIN: Chronic | ICD-10-CM

## 2025-01-13 RX ORDER — DAPAGLIFLOZIN 10 MG/1
10 TABLET, FILM COATED ORAL DAILY
Qty: 90 TABLET | Refills: 3 | Status: SHIPPED | OUTPATIENT
Start: 2025-01-13

## 2025-02-12 ENCOUNTER — OFFICE VISIT (OUTPATIENT)
Dept: ENDOCRINOLOGY | Facility: CLINIC | Age: 75
End: 2025-02-12
Payer: MEDICARE

## 2025-02-12 VITALS
HEART RATE: 73 BPM | OXYGEN SATURATION: 95 % | DIASTOLIC BLOOD PRESSURE: 68 MMHG | BODY MASS INDEX: 27.77 KG/M2 | WEIGHT: 194 LBS | SYSTOLIC BLOOD PRESSURE: 108 MMHG | HEIGHT: 70 IN

## 2025-02-12 DIAGNOSIS — Z79.4 TYPE 2 DIABETES MELLITUS WITHOUT COMPLICATION, WITH LONG-TERM CURRENT USE OF INSULIN: Primary | Chronic | ICD-10-CM

## 2025-02-12 DIAGNOSIS — E11.9 TYPE 2 DIABETES MELLITUS WITHOUT COMPLICATION, WITH LONG-TERM CURRENT USE OF INSULIN: Primary | Chronic | ICD-10-CM

## 2025-02-12 LAB
ALBUMIN UR-MCNC: <1.2 MG/DL
CREAT UR-MCNC: 41.4 MG/DL
EXPIRATION DATE: ABNORMAL
EXPIRATION DATE: ABNORMAL
GLUCOSE BLDC GLUCOMTR-MCNC: 136 MG/DL (ref 70–130)
HBA1C MFR BLD: 7.5 % (ref 4.5–5.7)
Lab: ABNORMAL
Lab: ABNORMAL
MICROALBUMIN/CREAT UR: NORMAL MG/G{CREAT}

## 2025-02-12 PROCEDURE — 83036 HEMOGLOBIN GLYCOSYLATED A1C: CPT | Performed by: PHYSICIAN ASSISTANT

## 2025-02-12 PROCEDURE — 1159F MED LIST DOCD IN RCRD: CPT | Performed by: PHYSICIAN ASSISTANT

## 2025-02-12 PROCEDURE — 82043 UR ALBUMIN QUANTITATIVE: CPT | Performed by: PHYSICIAN ASSISTANT

## 2025-02-12 PROCEDURE — 82947 ASSAY GLUCOSE BLOOD QUANT: CPT | Performed by: PHYSICIAN ASSISTANT

## 2025-02-12 PROCEDURE — 1160F RVW MEDS BY RX/DR IN RCRD: CPT | Performed by: PHYSICIAN ASSISTANT

## 2025-02-12 PROCEDURE — 3051F HG A1C>EQUAL 7.0%<8.0%: CPT | Performed by: PHYSICIAN ASSISTANT

## 2025-02-12 PROCEDURE — 99213 OFFICE O/P EST LOW 20 MIN: CPT | Performed by: PHYSICIAN ASSISTANT

## 2025-02-12 PROCEDURE — 82570 ASSAY OF URINE CREATININE: CPT | Performed by: PHYSICIAN ASSISTANT

## 2025-02-12 RX ORDER — BLOOD-GLUCOSE METER
KIT MISCELLANEOUS
Qty: 1 EACH | Refills: 0 | Status: SHIPPED | OUTPATIENT
Start: 2025-02-12

## 2025-02-12 RX ORDER — TAMSULOSIN HYDROCHLORIDE 0.4 MG/1
1 CAPSULE ORAL DAILY
COMMUNITY
Start: 2024-11-27

## 2025-02-12 RX ORDER — LANCETS 30 GAUGE
1 EACH MISCELLANEOUS 3 TIMES DAILY
Qty: 100 EACH | Refills: 11 | Status: SHIPPED | OUTPATIENT
Start: 2025-02-12

## 2025-02-12 RX ORDER — LEVOTHYROXINE SODIUM 125 UG/1
125 TABLET ORAL
COMMUNITY
Start: 2024-10-21

## 2025-02-12 RX ORDER — SODIUM FLUORIDE 6 MG/ML
PASTE, DENTIFRICE DENTAL
COMMUNITY

## 2025-02-12 NOTE — PROGRESS NOTES
Chief Complaint  F/u for Diabetes Mellitus.    HPI   Neno Green is a 75 y.o. male with afib on amiodarone and xarelto, who is here today for f/u of Diabetes Mellitus type 2. The initial diagnosis of diabetes was made at age 48.     He got a pacemaker and had cholecystectomy since last visit.   Needs to update patient assistance form for Ozempic.     Diabetic complications: none  Eye exam current (within one year): updated 5/2023 ([facility]) - macular degeneration     Current diabetic medications include:  Humalog, on insulin pump  Farxiga - pt assistance  Ozempic 0.5 mg weekly - pt assistance    Statin: pravastatin 40, tricor    Past medications: metformin (change to farxiga)     Diabetic Monitoring  -   [employer]tr[employer] pump downloaded and reviewed (1/30/2025 to 2/12/2025)- 62% basal, 38% bolus, entering blood sugars and carbs 1-3 times per day, blood sugars range  without hypoglycemia    He is on levothyroxine 125 mcg daily, this is managed by primary care, dose was increased 10/2024 due to tsh being 14    The following portions of the patient's history were reviewed and updated by me as appropriate: allergies, current medications, past family history, past social history, past surgical history and problem list.    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    Type 2 diabetes mellitus        Medications    Current Outpatient Medications:     amiodarone (PACERONE) 400 MG tablet, Take 1 tablet by mouth Every 12 (Twelve) Hours., Disp: , Rfl:     carvedilol (COREG) 6.25 MG tablet, Take 1 tablet by mouth 2 (Two) Times a Day With Meals., Disp: , Rfl:     Cinnamon 500 MG capsule, Take  by mouth., Disp: , Rfl:     dapagliflozin Propanediol (Farxiga) 10 MG tablet, Take 10 mg by mouth Daily., Disp: 90 tablet, Rfl: 3     docusate sodium (COLACE) 100 MG capsule, Take 1 capsule by mouth Daily., Disp: , Rfl:     fenofibrate (TRICOR) 145 MG tablet, Take 1 tablet by mouth Daily., Disp: , Rfl:     fluticasone (FLONASE) 50 MCG/ACT nasal spray, SHAKE LQ AND U 2 SPRAYS IEN D, Disp: , Rfl: 3    Insulin Infusion Pump (MiniMed 670G Insulin Pump) device, , Disp: , Rfl:     Insulin Lispro (HumaLOG) 100 UNIT/ML injection, MDD of 20 units Via pump daily, Disp: , Rfl:     isosorbide mononitrate (IMDUR) 30 MG 24 hr tablet, Take 1 tablet by mouth Daily., Disp: , Rfl:     levothyroxine (SYNTHROID, LEVOTHROID) 125 MCG tablet, Take 1 tablet by mouth Every Morning Before Breakfast., Disp: , Rfl:     loratadine (CLARITIN) 10 MG tablet, Take  by mouth., Disp: , Rfl:     losartan (COZAAR) 50 MG tablet, Take 1 tablet by mouth 2 (Two) Times a Day., Disp: , Rfl:     nitroglycerin (NITROSTAT) 0.4 MG SL tablet, Place 1 tablet under the tongue As Needed., Disp: , Rfl:     omeprazole (priLOSEC) 40 MG capsule, Take 1 capsule by mouth As Needed., Disp: , Rfl:     pravastatin (PRAVACHOL) 40 MG tablet, Take 0.5 tablets by mouth Daily., Disp: , Rfl:     Semaglutide,0.25 or 0.5MG/DOS, (Ozempic, 0.25 or 0.5 MG/DOSE,) 2 MG/3ML solution pen-injector, Inject 0.5 mg under the skin into the appropriate area as directed 1 (One) Time Per Week., Disp: 9 mL, Rfl: 3    tamsulosin (FLOMAX) 0.4 MG capsule 24 hr capsule, Take 1 capsule by mouth Daily., Disp: , Rfl:     XARELTO 20 MG tablet, Take  by mouth Daily., Disp: , Rfl: 2    glucose blood test strip, Use to check BG 3x/day dx e11.9, Disp: 100 each, Rfl: 11    glucose monitor monitoring kit, Use to check BG 3x/day, Disp: 1 each, Rfl: 0    Lancets misc, Use 1 each 3 times a day., Disp: 100 each, Rfl: 11    Sodium Fluoride 5000 PPM 1.1 % paste, APPLY PEA SIZED AMOUNT TO TOOTHBRUSH AND BRUSH TEETH BEFORE BEDTIME. DO NOT EAT OR DRINK FOLLOWING APPLICATION FOR AT LEAST 30 MINUTES, Disp: , Rfl:     Review of Systems  Review of Systems  "  All other systems reviewed and are negative.       Physical Exam    /68 (BP Location: Left arm, Patient Position: Sitting)   Pulse 73   Ht 177.8 cm (70\")   Wt 88 kg (194 lb)   SpO2 95%   BMI 27.84 kg/m² Body mass index is 27.84 kg/m².  Physical Exam   Constitutional: He is oriented to person, place, and time. He appears well-developed. No distress.   HENT:   Head: Normocephalic.   Neck: No JVD present. No tracheal deviation present. No thyromegaly present.   Cardiovascular: Normal rate and regular rhythm.   Pulmonary/Chest: Effort normal and breath sounds normal.   Musculoskeletal: No tenderness.    Neno had a diabetic foot exam performed today.   During the foot exam he had a monofilament test performed.    Neurological Sensory Findings -  Unaltered sharp/dull right ankle/foot discrimination and unaltered sharp/dull left ankle/foot discrimination. No right ankle/foot altered proprioception and no left ankle/foot altered proprioception  Vascular Status -  His right foot exhibits normal foot vasculature  and no edema. His left foot exhibits normal foot vasculature  and no edema.  Skin Integrity  -  His right foot skin is intact. He has callous right foot.  He has no right foot onychomycosis and no right foot ulcer.His left foot skin is intact.He has callous left foot. He has no left foot onychomycosis and no left foot ulcer..  Neurological: He is alert and oriented to person, place, and time.   Skin: Skin is warm and dry. No rash noted. He is not diaphoretic. No erythema.   Psychiatric: His behavior is normal. Judgment and thought content normal.       Labs and Imaging   Lab Results   Component Value Date    HGBA1C 7.5 (A) 02/12/2025    HGBA1C 6.7 (A) 08/12/2024    HGBA1C 6.3 (A) 02/12/2024     Office Visit on 02/12/2025   Component Date Value Ref Range Status    Glucose 02/12/2025 136 (A)  70 - 130 mg/dL Final    Lot Number 02/12/2025 [id number]   Final    Expiration Date 02/12/2025 08/09/2025   Final    " Hemoglobin A1C 02/12/2025 7.5 (A)  4.5 - 5.7 % Final    Lot Number 02/12/2025 [id number]   Final    Expiration Date 02/12/2025 10/18/2026   Final     External labs from 10/11/2024 reviewed: Hemoglobin 16, hematocrit 48.9, creatinine 1.51, GFR 48, AST 19, ALT 16, triglycerides 111, LDL 58, A1c 6.9, 14.34    Assessment / Plan   Diagnoses and all orders for this visit:    1. Type 2 diabetes mellitus without complication, with long-term current use of insulin (Primary)  -     POC Glucose, Blood  -     POC Glycosylated Hemoglobin (Hb A1C)  -     glucose blood test strip; Use to check BG 3x/day dx e11.9  Dispense: 100 each; Refill: 11  -     glucose monitor monitoring kit; Use to check BG 3x/day  Dispense: 1 each; Refill: 0  -     Lancets misc; Use 1 each 3 times a day.  Dispense: 100 each; Refill: 11  -     Microalbumin / Creatinine Urine Ratio - Urine, Clean Catch          Diabetes Mellitus 2 is under good control   -A1c 7.5  -He denies hypoglycemia   -no pump setting adjustments  -continue farxiga  -Continue Ozempic 0.5 mg weekly - patient assistance application updated  -historically declined a CGM  -foot exam updated today, labs updated with pcp 10/2024, check UACR today, eye exam is utd      There are no Patient Instructions on file for this visit.      Follow up: Return in about 4 months (around 6/12/2025).       Tervor Jang PA-C

## 2025-02-20 ENCOUNTER — TELEPHONE (OUTPATIENT)
Dept: ENDOCRINOLOGY | Facility: CLINIC | Age: 75
End: 2025-02-20
Payer: MEDICARE

## 2025-02-20 NOTE — TELEPHONE ENCOUNTER
Caller: Neno Green    Relationship to patient: Self    Best call back number: [phone]    Patient is needing: PT CALLED AND NEEDS MORE INFO SENT TO [employer] FOR THE PATIENT ASSISTANCE PROGRAM FOR THE OZEMPIC. HE SAID HE SPOKE TO BENJI ABOUT IT THE OTHER DAY.

## 2025-02-21 ENCOUNTER — TELEPHONE (OUTPATIENT)
Dept: ENDOCRINOLOGY | Facility: CLINIC | Age: 75
End: 2025-02-21
Payer: MEDICARE

## 2025-02-21 NOTE — TELEPHONE ENCOUNTER
"PT CAME INTO THE OFFICE TO F/U ON [employer] PAPERWORK. THE LAST CORRESPONDENCE WE RECEIVED FROM THEM WAS 2/14/25 AND THEY STATED \"SECTION C WAS NOT FILLED OUT\". PT REQUESTED WE LOOK INTO THIS AND REACH OUT TO HIM.    HE STATED HE HAS SAMPLES TO LAST HIM FOR A FEW WEEKS.   "

## 2025-02-27 ENCOUNTER — OFFICE VISIT (OUTPATIENT)
Dept: UROLOGY | Facility: CLINIC | Age: 75
End: 2025-02-27
Payer: MEDICARE

## 2025-02-27 ENCOUNTER — PREP FOR SURGERY (OUTPATIENT)
Dept: OTHER | Facility: HOSPITAL | Age: 75
End: 2025-02-27
Payer: MEDICARE

## 2025-02-27 VITALS — OXYGEN SATURATION: 96 % | HEART RATE: 70 BPM | DIASTOLIC BLOOD PRESSURE: 85 MMHG | SYSTOLIC BLOOD PRESSURE: 132 MMHG

## 2025-02-27 DIAGNOSIS — N13.8 BPH WITH OBSTRUCTION/LOWER URINARY TRACT SYMPTOMS: Primary | ICD-10-CM

## 2025-02-27 DIAGNOSIS — R33.9 URINARY RETENTION: ICD-10-CM

## 2025-02-27 DIAGNOSIS — N40.1 BPH WITH OBSTRUCTION/LOWER URINARY TRACT SYMPTOMS: Primary | ICD-10-CM

## 2025-02-27 PROCEDURE — 1160F RVW MEDS BY RX/DR IN RCRD: CPT | Performed by: STUDENT IN AN ORGANIZED HEALTH CARE EDUCATION/TRAINING PROGRAM

## 2025-02-27 PROCEDURE — 99204 OFFICE O/P NEW MOD 45 MIN: CPT | Performed by: STUDENT IN AN ORGANIZED HEALTH CARE EDUCATION/TRAINING PROGRAM

## 2025-02-27 PROCEDURE — 1159F MED LIST DOCD IN RCRD: CPT | Performed by: STUDENT IN AN ORGANIZED HEALTH CARE EDUCATION/TRAINING PROGRAM

## 2025-02-27 NOTE — PROGRESS NOTES
"     LUTS Male Office Visit      Patient Name: Neno Green  : 1950   MRN: [id number]     Chief Complaint:  Lower Urinary Tract Symptoms.   Chief Complaint   Patient presents with    Benign Prostatic Hypertrophy        Referring Provider: Aureliano Aguilar MD    History of Present Illness: Mr. Green is a 75 y.o. male with history of lower urinary tract symptoms. History includes pacemaker, HTN, HLD, type II DM, hypothyroid, bradycardia, paroxysmal atrial fibrillation, CAD, mild aortic stenosis.  On Xarelto.     He is on Tamsulosin since \"maybe last November prescribed by his PCP.     His primary urinary symptoms include weak stream, urgency, frequency, nocturia, sensation of incomplete bladder emptying.    He endorses a history of urinary retention roughly a few months ago.  He was out of town at the time but was with a family member who has to perform self-catheterization.  His family member gave him a couple of catheters and he has been performing self-catheterization as needed for urinary retention.  He has not had to self catheterize for at least the last month.  PVR 73 mL today indicating incomplete bladder emptying.    IPSS score today is 20 with a mixed quality of life.    No results found for: \"PSA\"        Subjective      Review of System: Review of Systems   Genitourinary:  Positive for frequency and urgency. Negative for decreased urine volume, difficulty urinating, dysuria, enuresis, flank pain and hematuria.      I have reviewed the ROS documented by my clinical staff, I have updated appropriately and I agree. Bang Mora MD    Past Medical History:  Past Medical History:   Diagnosis Date    Acid reflux     Arthritis     Asthma     Depression     Diabetes insipidus     Heart attack     Heart disease     Hyperlipidemia     Hypertension     Migraine     Testosterone deficiency ??    Type 2 diabetes mellitus        Past Surgical History:  Past Surgical History:   Procedure " Laterality Date    APPENDECTOMY      CARDIAC SURGERY      CHOLECYSTECTOMY N/A 11/2024    LAMINECTOMY      PACEMAKER IMPLANTATION N/A 12/2024       Medications:    Current Outpatient Medications:     amiodarone (PACERONE) 400 MG tablet, Take 1 tablet by mouth Every 12 (Twelve) Hours., Disp: , Rfl:     carvedilol (COREG) 6.25 MG tablet, Take 1 tablet by mouth 2 (Two) Times a Day With Meals., Disp: , Rfl:     Cinnamon 500 MG capsule, Take  by mouth., Disp: , Rfl:     dapagliflozin Propanediol (Farxiga) 10 MG tablet, Take 10 mg by mouth Daily., Disp: 90 tablet, Rfl: 3    docusate sodium (COLACE) 100 MG capsule, Take 1 capsule by mouth Daily., Disp: , Rfl:     fenofibrate (TRICOR) 145 MG tablet, Take 1 tablet by mouth Daily., Disp: , Rfl:     fluticasone (FLONASE) 50 MCG/ACT nasal spray, SHAKE LQ AND U 2 SPRAYS IEN D, Disp: , Rfl: 3    glucose blood test strip, Use to check BG 3x/day dx e11.9, Disp: 100 each, Rfl: 11    glucose monitor monitoring kit, Use to check BG 3x/day, Disp: 1 each, Rfl: 0    Insulin Infusion Pump (MiniMed 670G Insulin Pump) device, , Disp: , Rfl:     Insulin Lispro (HumaLOG) 100 UNIT/ML injection, MDD of 20 units Via pump daily, Disp: , Rfl:     isosorbide mononitrate (IMDUR) 30 MG 24 hr tablet, Take 1 tablet by mouth Daily., Disp: , Rfl:     Lancets misc, Use 1 each 3 times a day., Disp: 100 each, Rfl: 11    levothyroxine (SYNTHROID, LEVOTHROID) 125 MCG tablet, Take 1 tablet by mouth Every Morning Before Breakfast., Disp: , Rfl:     loratadine (CLARITIN) 10 MG tablet, Take  by mouth., Disp: , Rfl:     losartan (COZAAR) 50 MG tablet, Take 1 tablet by mouth 2 (Two) Times a Day., Disp: , Rfl:     omeprazole (priLOSEC) 40 MG capsule, Take 1 capsule by mouth As Needed., Disp: , Rfl:     pravastatin (PRAVACHOL) 40 MG tablet, Take 0.5 tablets by mouth Daily., Disp: , Rfl:     Semaglutide,0.25 or 0.5MG/DOS, (Ozempic, 0.25 or 0.5 MG/DOSE,) 2 MG/3ML solution pen-injector, Inject 0.5 mg under the skin into  the appropriate area as directed 1 (One) Time Per Week., Disp: 9 mL, Rfl: 3    Sodium Fluoride 5000 PPM 1.1 % paste, APPLY PEA SIZED AMOUNT TO TOOTHBRUSH AND BRUSH TEETH BEFORE BEDTIME. DO NOT EAT OR DRINK FOLLOWING APPLICATION FOR AT LEAST 30 MINUTES, Disp: , Rfl:     tamsulosin (FLOMAX) 0.4 MG capsule 24 hr capsule, Take 1 capsule by mouth Daily., Disp: , Rfl:     XARELTO 20 MG tablet, Take  by mouth Daily., Disp: , Rfl: 2    Allergies:  Allergies   Allergen Reactions    Keflex [Cephalexin] Hives       Social History:  Social History     Socioeconomic History    Marital status:    Tobacco Use    Smoking status: Former    Smokeless tobacco: Never    Tobacco comments:     Teenager   Vaping Use    Vaping status: Never Used   Substance and Sexual Activity    Alcohol use: Never    Drug use: Never    Sexual activity: Not Currently     Partners: Female     Birth control/protection: Spermicide, Vasectomy       Family History:  Family History   Problem Relation Age of Onset    Colon cancer Mother     Cancer Mother         Colon cancer developed in her late 70s.    Heart attack Father     Obesity Father         Heart surgery in 1967, and passed away in 1975.       IPSS Questionnaire (AUA-7):  Over the past month…    1)  Incomplete Emptying:       How often have you had a sensation of not emptying you had the sensation of not emptying your bladder completely after you finished urinating?  2 - Less than half the time   2)  Frequency:       How often have you had the urinate again less than two hours after you finished urinating?  5 - Almost always   3)  Intermittency:       How often have you found you stopped and started again several times when you urinated?   4 - More than half the time   4) Urgency:      How often have you found it difficult to postpone urination?  3 - About half the time   5) Weak Stream:      How often have you had a weak urinary stream?  3 - About half the time   6) Straining:       How often  "have you had to push or strain to begin urination?  2 - Less than half the time   7) Nocturia:      How many times did you most typically get up to urinate from the time you went to bed at night until the time you got up in the morning?  1 - 1 time   Total Score:  20   The International Prostate Symptom Score (IPSS) is used to screen, diagnose, track symptoms of benign prostatic hyperplasia (BPH).   0-7 (Mild Symptoms) 8-19 (Moderate) 20-35 (Severe)   Quality of Life (QoL):  If you were to spend the rest of your life with your urinary condition just the way it is now, how would you feel about that? 3-Mixed   Urine Leakage (Incontinence) 0-No Leakage       Post void residual bladder scan:   73 mL    Objective     Physical Exam:   Vital Signs:   Vitals:    02/27/25 1456   BP: 132/85   Pulse: 70   SpO2: 96%     There is no height or weight on file to calculate BMI.     Physical Exam  Constitutional:       Appearance: Normal appearance.   HENT:      Head: Normocephalic and atraumatic.      Nose: Nose normal.   Eyes:      Extraocular Movements: Extraocular movements intact.      Conjunctiva/sclera: Conjunctivae normal.      Pupils: Pupils are equal, round, and reactive to light.   Musculoskeletal:         General: Normal range of motion.      Cervical back: Normal range of motion and neck supple.   Skin:     General: Skin is warm and dry.      Findings: No lesion or rash.   Neurological:      General: No focal deficit present.      Mental Status: He is alert and oriented to person, place, and time. Mental status is at baseline.   Psychiatric:         Mood and Affect: Mood normal.         Behavior: Behavior normal.         Labs:   No results found for: \"PSA\"    Brief Urine Lab Results  (Last result in the past 365 days)        Color   Clarity   Blood   Leuk Est   Nitrite   Protein   CREAT   Urine HCG        02/12/25 1247             41.4                      Lab Results   Component Value Date    GLUCOSE 170 (H) 02/17/2021 "    CALCIUM 9.7 02/17/2021     02/17/2021    K 5.1 02/17/2021    CO2 22 02/17/2021     02/17/2021    BUN 20 02/17/2021    CREATININE 1.43 (H) 02/17/2021    EGFRIFAFRI 56 (L) 02/17/2021    EGFRIFNONA 48 (L) 02/17/2021    BCR 14 02/17/2021    ANIONGAP 14.3 08/01/2017       Lab Results   Component Value Date    WBC 7.6 08/01/2017    HGB 14.2 08/01/2017    HCT 41.6 08/01/2017    MCV 94.3 (H) 08/01/2017     08/01/2017       Images:   No Images in the past 120 days found..    Measures:   Tobacco:   Neno Green  reports that he has quit smoking. He has never used smokeless tobacco.     Assessment / Plan      Assessment:  Mr. Green is a 75 y.o. male who presented today with lower urinary tract symptoms.  Patient likely has BPH.  He has developed new onset urinary retention. Symptoms no better on Floamx.  He has had to catheterize a few times.  Recommend full workup including cystoscopy, TRUS, uroflow to confirm BPH and to determine if the patient would benefit from surgical intervention..  We discussed BPH, its etiologies and his relevant treatment options.  This includes maximizing medical therapy with the addition of finasteride or dutasteride or proceeding with a surgical intervention.  He is most interested in proceeding with BPH surgical intervention if necessary.  We discussed greenlight PVP would likely be a best option for the patient given his need for anticoagulation.  Risks of cystoscopy discussed including bleeding, dysuria, infection.    Diagnoses and all orders for this visit:    1. BPH with obstruction/lower urinary tract symptoms (Primary)    2. Urinary retention            Follow Up:   Return in about 12 days (around 3/11/2025) for Cysto, TRUS, Uroflow .    I spent approximately 30 minutes providing clinical care for this patient; including review of patient's chart and provider documentation, face to face time spent with patient in examination room (obtaining history, performing  physical exam, discussing diagnosis and management options), placing orders, and completing patient documentation.     Bang Mora MD  [facility] [place]

## 2025-03-11 ENCOUNTER — PROCEDURE VISIT (OUTPATIENT)
Dept: UROLOGY | Facility: CLINIC | Age: 75
End: 2025-03-11
Payer: MEDICARE

## 2025-03-11 DIAGNOSIS — N40.1 BPH WITH OBSTRUCTION/LOWER URINARY TRACT SYMPTOMS: Primary | ICD-10-CM

## 2025-03-11 DIAGNOSIS — N13.8 BPH WITH OBSTRUCTION/LOWER URINARY TRACT SYMPTOMS: Primary | ICD-10-CM

## 2025-03-11 DIAGNOSIS — N30.00 ACUTE CYSTITIS WITHOUT HEMATURIA: ICD-10-CM

## 2025-03-11 DIAGNOSIS — R33.9 URINARY RETENTION: ICD-10-CM

## 2025-03-11 PROCEDURE — 81003 URINALYSIS AUTO W/O SCOPE: CPT | Performed by: STUDENT IN AN ORGANIZED HEALTH CARE EDUCATION/TRAINING PROGRAM

## 2025-03-11 PROCEDURE — 87077 CULTURE AEROBIC IDENTIFY: CPT | Performed by: STUDENT IN AN ORGANIZED HEALTH CARE EDUCATION/TRAINING PROGRAM

## 2025-03-11 PROCEDURE — 87186 SC STD MICRODIL/AGAR DIL: CPT | Performed by: STUDENT IN AN ORGANIZED HEALTH CARE EDUCATION/TRAINING PROGRAM

## 2025-03-11 PROCEDURE — 87086 URINE CULTURE/COLONY COUNT: CPT | Performed by: STUDENT IN AN ORGANIZED HEALTH CARE EDUCATION/TRAINING PROGRAM

## 2025-03-11 RX ORDER — SULFAMETHOXAZOLE AND TRIMETHOPRIM 800; 160 MG/1; MG/1
1 TABLET ORAL 2 TIMES DAILY
Qty: 10 TABLET | Refills: 0 | Status: SHIPPED | OUTPATIENT
Start: 2025-03-11

## 2025-03-11 RX ORDER — FLUCONAZOLE 150 MG/1
150 TABLET ORAL ONCE
Qty: 1 TABLET | Refills: 0 | Status: SHIPPED | OUTPATIENT
Start: 2025-03-11 | End: 2025-03-11

## 2025-03-11 NOTE — PROGRESS NOTES
Preprocedure diagnosis  BPH with LUTS    Postprocedure diagnosis  BPH with LUTS    Procedure  Flexible Cystourethroscopy   Transrectal Ultrasound Guided Prostate Sizing  Uroflowmetry + PVR    Attending surgeon  Bang Mora MD    Anesthesia  2% lidocaine jelly intraurethrally    Complications  None    Indications  75 y.o. male undergoing a flexible cystoscopy for the above mentioned indications.  Informed consent was obtained.      Findings  No obvious urethral stricture, bladder tumor, bladder stones, or urothelial lesions identified.    Severe lateral lobe hyperplasia, mild intravesical median lobe.  Mild chronic findings indicative of likely chronic bladder outlet obstruction     TRUS prostate sizing personally performed, (height (cm) × length (cm) × width (cm) × ?/6) = 43 cc/mL.     Procedure  The patient was placed in supine position and prepped and draped in sterile fashion with lidocaine jelly instilled per the urethra for local anesthesia 5 minutes prior to procedure start.  A brief timeout was performed including available nursing staff and the patient.      The 16 Fr digital flexible cystoscope was lubricated and gently advanced into the urethral meatus.     Penile and bulbar urethra appeared widely patent without visible lesion or stricture.   Prostatic urethra demonstrates moderate to severe lateral lobe coaptation, with mild median lobe component.     Scope then advanced into the bladder. Bladder was completely visualized starting with the trigone.   There were bilateral orthotopic ureteral orifices effluxing clear urine.   Posterior, lateral, anterior walls, and dome completely visualized revealing NO obvious mucosal lesions, tumors, or bladder stones.    There is mild trabeculation along the posterior and lateral walls with no obvious bladder divericuli.     Cystoscope was retroflexed and bladder neck and prostate further visualized confirming intravesical lateral and median lobe impinging on  bladder basin.     In the trigone the patient has significantly concentrated urine with cloudy white debris possibly consistent with UTI versus fungal urinary infection.  He is on Farxiga for diabetes.    The bladder was left filled, and the cystoscope was then gently withdrawn while visualizing the urethra and the procedure was then terminated.      The patient was then positioned and prepped in a left lateral position with legs flexed at knee.    A digital rectal exam was performed revealing smooth gland without nodules or induration.    Rectal ultrasound probe was slowly introduced via anus without difficulty.    Prostate and seminal vesicles were inspected systematically using axial and sagittal views with the ultrasound.      The dimensions of the prostate were measured, for a calculated volume of 43 mL.     Rectal ultrasound probe was removed.      The patient tolerated the procedure well.      He then voided for uroflowmetry measurement and PVR bladder scan was performed afterwards.     Discussion:  The patient was moved from the procedure room to a clinic examination room to review results of BPH workup today.     Uroflow   Avg flow: 5.7 ml/sec  Max flow: 12.4 ml/sec  Time to max flow: 10.4 sec  Voided volume: 236 mL     PVR bladder scan: 14 mL     I have evaluated the patient's ongoing urinary symptoms and options for management with the patient today.     The patient's cystoscopy and uroflow results indicate likely chronic bladder outlet obstruction    Prostate volume is mildly enlarged.     The patient was counseled regarding options regarding his continued BPH with lower urinary tract symptoms. He is currently medically managed with tamsulosin but has a history of urinary retention requiring self cath    Medical Options vs Continued Observation  Continued monitoring may be appropriate but this is a shared decision made with patient based on assumed risk.     Continued alpha blocker therapy, the addition  of 5-alpha reductase inhibitors (if not already prescribed), or additional anticholinergic/beta 3-agonist medications discussed as options.    Surgical Options  Decision to proceed with surgical intervention is multi-factorial and based on patient degree of bother, presumed risk of bladder health decline over time, desire to limit or reduce medication usage (polypharmacy), and avoidance of potential side effects of BPH medication usage (light headedness, hypotension, sexual dysfunction, retrograde ejaculation, dry eye/mouth, memory changes, etc).      Surgical options include transurethral resection of prostate, greenlight photo vaporization of prostate, Urolift (prostatic urethral lift), Aquablation, robotic simple prostatectomy (for extreme BPH).     Downsides to TURP include: likely need for overnight inpatient admission, higher bleeding risk, need for bladder irrigation.     Greenlight PVP benefits include: reduced bleeding risk, possible outpatient procedure but does require catheter placement for 48 hours or more or minimally invasive     Urolift benefits include: typically outpatient, often no catheter required post-op, preserves ejaculation without ED risk, prostate volume must be less than 100 cc, good data out to 5 years+ currently, may need additional more aggressive surgery long term if re-growth/re-obstruction, re-treatment rate typically listed as 15% or more long term.     I discussed that both TURP and Greenlight PVP will result in retrograde ejaculation or possibly anejaculation depending on aggressiveness of tissue removal, small risk of transient vs long term (rare) stress incontinence, small risk of urethral stricture or bladder neck contracture, ureteral injury, tissue regrowth requiring additional procedure long term, bladder perforation requiring repair, infection, hematuria, and anesthetic related complications not withstanding. Greenlight PVP and TURP may result in possible better long term  symptom reduction vs Urolift however long term data is still accumulating regarding Urolift.     Aquablation is a new BPH treatment option available at [facility]. This accomplishes the same goals as TURP, Greenlight PVP, or robotic simple prostatectomy with efficient tissue removal with pressurized waterjet therapy followed by transurethral fulguration of bleeding vessels with a cautery loop.  Benefit of this operation is that according to recent literature has less risk of long-term irreversible side effects such as retrograde ejaculation or urinary incontinence while achieving significant and efficient prostate tissue removal.  I still  patients regarding the classic risks of any bladder outlet surgery similar to greenlight and TURP listed above.    After a thorough discussion >20 minutes with the patient today, he elects to proceed with greenlight PVP    PLAN  -Patient has chronic bladder outlet obstruction in the setting of a mildly enlarged prostate but significant transitional zone enlargement with bladder outlet obstruction.  He has cloudy appearing urine today.  I will send a urine culture.  We will treat him with Diflucan to cover for possible fungal infection and Bactrim for possible bacterial UTI.  I will call him with results.  Return precautions discussed.  He is on anticoagulation and has some cardiac history, based on prostate size and anticoagulation use he would be best served with surgery in the form of greenlight PVP.  Risks thoroughly discussed as above.  Will schedule next available in May.    Bang Mora MD

## 2025-03-12 LAB
BILIRUB UR QL STRIP: NEGATIVE
CLARITY UR: ABNORMAL
COLOR UR: YELLOW
GLUCOSE UR STRIP-MCNC: ABNORMAL MG/DL
HGB UR QL STRIP.AUTO: ABNORMAL
KETONES UR QL STRIP: NEGATIVE
LEUKOCYTE ESTERASE UR QL STRIP.AUTO: ABNORMAL
NITRITE UR QL STRIP: NEGATIVE
PH UR STRIP.AUTO: 7 [PH] (ref 5–8)
PROT UR QL STRIP: NEGATIVE
SP GR UR STRIP: 1.01 (ref 1–1.03)
UROBILINOGEN UR QL STRIP: ABNORMAL

## 2025-03-13 LAB — BACTERIA SPEC AEROBE CULT: ABNORMAL

## 2025-03-14 ENCOUNTER — TELEPHONE (OUTPATIENT)
Dept: ENDOCRINOLOGY | Facility: CLINIC | Age: 75
End: 2025-03-14
Payer: MEDICARE

## 2025-03-14 NOTE — TELEPHONE ENCOUNTER
I called the pt he didn't answer, I left a voicemail to return our call that his pt assistance ozempic is here and ready for

## 2025-04-17 ENCOUNTER — TELEPHONE (OUTPATIENT)
Dept: UROLOGY | Facility: CLINIC | Age: 75
End: 2025-04-17
Payer: MEDICARE

## 2025-04-17 NOTE — TELEPHONE ENCOUNTER
Caller: CORINNE PAL    Relationship to patient: SELF    Best call back number: [phone]    Patient is needing: PT IS SCHEDULED FOR SURGERY ON 5/6/25.  PT WAS SEEN AT ER IN [place] AND CATHETER WAS PLACED AND BOWELS WERE CLEANED OUT DUE TO CONSTIPATION.    PT WANTED YOU TO BE AWARE WITH UPCOMING SURGERY

## 2025-04-24 ENCOUNTER — CLINICAL SUPPORT (OUTPATIENT)
Dept: UROLOGY | Facility: CLINIC | Age: 75
End: 2025-04-24
Payer: MEDICARE

## 2025-04-24 DIAGNOSIS — Z46.6 ENCOUNTER FOR FOLEY CATHETER REMOVAL: Primary | ICD-10-CM

## 2025-04-24 NOTE — PROGRESS NOTES
PT presented to our office for melendez catheter removal and voiding trial. PT's melendez catheter was draining yellow urine without Sediment. I first explained to the PT what I will be doing throughout the voiding trial and answered any questions PT and family may have. Then I removed the bag from the catheter and using a piston syringe, inserted 200 mL of sterile water into the PT's bladder. When PT notified me they began to feel a strong urge to urinate, I deflated the catheter balloon holding 10 mL of water. Once the balloon was empty, I removed the catheter in a smooth and gentle movement. I instructed the PT to try and urinate into the hand-held urinal . PT was able to void 250 mL of yellow urine. PT tolerated well. I advised PT and family to drink lots of water, rest, call us if they are suddenly unable to urinate, or if large amounts of blood/clots are suddenly in urine. PT verbalized understanding.     ==  MA note reviewed    Bang Mora MD

## 2025-04-29 ENCOUNTER — PRE-ADMISSION TESTING (OUTPATIENT)
Dept: PREADMISSION TESTING | Facility: HOSPITAL | Age: 75
End: 2025-04-29
Payer: MEDICARE

## 2025-04-29 VITALS — BODY MASS INDEX: 27.63 KG/M2 | HEIGHT: 70 IN | WEIGHT: 193.01 LBS

## 2025-04-29 DIAGNOSIS — N40.1 BPH WITH OBSTRUCTION/LOWER URINARY TRACT SYMPTOMS: ICD-10-CM

## 2025-04-29 DIAGNOSIS — N13.8 BPH WITH OBSTRUCTION/LOWER URINARY TRACT SYMPTOMS: ICD-10-CM

## 2025-04-29 LAB
ANION GAP SERPL CALCULATED.3IONS-SCNC: 12 MMOL/L (ref 5–15)
BUN SERPL-MCNC: 27 MG/DL (ref 8–23)
BUN/CREAT SERPL: 17.9 (ref 7–25)
CALCIUM SPEC-SCNC: 9.5 MG/DL (ref 8.6–10.5)
CHLORIDE SERPL-SCNC: 104 MMOL/L (ref 98–107)
CO2 SERPL-SCNC: 22 MMOL/L (ref 22–29)
CREAT SERPL-MCNC: 1.51 MG/DL (ref 0.76–1.27)
DEPRECATED RDW RBC AUTO: 51.8 FL (ref 37–54)
EGFRCR SERPLBLD CKD-EPI 2021: 47.3 ML/MIN/1.73
ERYTHROCYTE [DISTWIDTH] IN BLOOD BY AUTOMATED COUNT: 15.2 % (ref 12.3–15.4)
GLUCOSE SERPL-MCNC: 92 MG/DL (ref 65–99)
HBA1C MFR BLD: 7 % (ref 4.8–5.6)
HCT VFR BLD AUTO: 42 % (ref 37.5–51)
HGB BLD-MCNC: 13.7 G/DL (ref 13–17.7)
INR PPP: 1.31 (ref 0.89–1.12)
MCH RBC QN AUTO: 30.4 PG (ref 26.6–33)
MCHC RBC AUTO-ENTMCNC: 32.6 G/DL (ref 31.5–35.7)
MCV RBC AUTO: 93.1 FL (ref 79–97)
PLATELET # BLD AUTO: 318 10*3/MM3 (ref 140–450)
PMV BLD AUTO: 9 FL (ref 6–12)
POTASSIUM SERPL-SCNC: 4.6 MMOL/L (ref 3.5–5.2)
PROTHROMBIN TIME: 17.1 SECONDS (ref 12.2–15.3)
QT INTERVAL: 528 MS
QTC INTERVAL: 540 MS
RBC # BLD AUTO: 4.51 10*6/MM3 (ref 4.14–5.8)
SODIUM SERPL-SCNC: 138 MMOL/L (ref 136–145)
WBC NRBC COR # BLD AUTO: 5.96 10*3/MM3 (ref 3.4–10.8)

## 2025-04-29 PROCEDURE — 36415 COLL VENOUS BLD VENIPUNCTURE: CPT

## 2025-04-29 PROCEDURE — 85610 PROTHROMBIN TIME: CPT

## 2025-04-29 PROCEDURE — 83036 HEMOGLOBIN GLYCOSYLATED A1C: CPT

## 2025-04-29 PROCEDURE — 85027 COMPLETE CBC AUTOMATED: CPT

## 2025-04-29 PROCEDURE — 93005 ELECTROCARDIOGRAM TRACING: CPT

## 2025-04-29 PROCEDURE — 80048 BASIC METABOLIC PNL TOTAL CA: CPT

## 2025-04-29 RX ORDER — ONDANSETRON 4 MG/1
4 TABLET, FILM COATED ORAL DAILY
COMMUNITY

## 2025-04-29 RX ORDER — NITROGLYCERIN 0.4 MG/1
0.4 TABLET SUBLINGUAL
COMMUNITY

## 2025-04-29 NOTE — PAT
An arrival time for procedure was not provided during PAT visit. If patient had any questions or concerns about their arrival time, they were instructed to contact their surgeon/physician.  Additionally, if the patient referred to an arrival time that was acquired from their my chart account, patient was encouraged to verify that time with their surgeon/physician. Arrival times are NOT provided in Pre Admission Testing Department.    Patient denies any current skin issues.     Patient instructed to drink 20 ounces of Gatorade or Gatorlyte (if diabetic) and it needs to be completed 1 hour (for Main OR patients) or 2 hours (scheduled  section & [facility] patients) before given arrival time for procedure (NO RED Gatorade and NO Gatorade Zero).    Patient verbalized understanding.    Per Anesthesia Request, patient instructed not to take their ACE/ARB medications on the AM of surgery.    Cardiac risk assessment/blood thinner statement requested from Dr. Augustine on 25.     Pacemaker device check in pre-op.     Database to be reviewed and updated in pre-op; pt 30 min late to PAT.

## 2025-04-29 NOTE — PAT
Verified patient previously completed cardiology visit for cardiac risk assessment in preparation for upcoming procedure, completion of 12-lead ECG within six months, and risk assessment letter reviewed. No further interventions required.      Cleared by Dr Augustine on 4/29/25. Chart forwarded to pre op. Patient may hold blood thinners 2-3 days.

## 2025-04-29 NOTE — DISCHARGE INSTRUCTIONS
The following information and instructions were given:    Do not eat, drink, smoke or chew gum after midnight the night before surgery. This includes no mints.  Take all routine, prescribed medications including heart and blood pressure medicines with a sip of water unless otherwise instructed by your physician.   Do NOT take diabetic medication unless instructed by your physician.      DO NOT shave for two days before your procedure.  Do not wear makeup.      DO NOT wear fingernail polish (gel/regular) and/or acrylic/artificial nails on the day of surgery. If you had a recent manicure and would rather not remove polish or artificial nails, the minimum requirement is that the polish/artificial nails must be removed from the middle finger on each hand.      If you are having surgery/procedure on an upper extremity, fingernail polish/artificial fingernails must be removed for surgery.  NO EXCEPTIONS.      If you are having surgery/procedure on a lower extremity, toenail polish on both extremities must be removed for surgery.  NO EXCEPTIONS.    Remove all jewelry (advise to go to jeweler if unable to remove).  Jewelry, especially rings, can no longer be taped for surgery.    Leave anything you consider valuable at home.    Leave your suitcase in the car until after your surgery if you are staying overnight.    Bring the following with you the day of your procedure (when applicable):       -Picture ID and insurance cards       -Co-pay/deductible required by insurance       -Medications in the original bottles or a detailed list (name, dosage, frequency of medications) including all over-the-counter medications if not brought to PAT       -Copy of advance directive, living will or power of  documents if not brought to PAT       -CPAP or BIPAP mask and tubing (do not bring machine)       -Skin prep instruction(s) sheet       -PAT Pass    Educational handout or binder (joint replacements) related to procedure given  to patient.  Educational handout also includes general information related to the recovery that mentions signs and symptoms of infection and when to call the doctor.    When applicable, an ERAS handout was given to patient.    Respirex use and pneumonia prevention education provided in Pre Admission Testing general education video.    Information related to infection and hand hygiene mentioned in Pre Admission Testing general education video. Patient instructed to call their doctor if any of the following symptoms are noted during recovery:  Fever of 100.4 F or higher, incision that is warm or has increasing bleeding, redness or drainage.    DVT Prevention instructions given in general education video presentation during Pre Admission Testing appointment that stress the importance of ambulation to improve blood circulation.  Also encouraged patient to perform foot exercises when in bed and application of a sequential device may be applied to lower extremities to improve circulation.      Please apply Chlorhexidine wipes to surgical area (if instructed) the night before procedure and the AM of procedure and document date/time of applications on skin prep instruction sheet.    If you are being discharged home the same day as surgery, you must have someone to drive you home and someone must stay with you the first 24 hours your procedure.

## 2025-04-30 ENCOUNTER — OFFICE VISIT (OUTPATIENT)
Dept: ENDOCRINOLOGY | Facility: CLINIC | Age: 75
End: 2025-04-30
Payer: MEDICARE

## 2025-04-30 VITALS
WEIGHT: 194.5 LBS | HEIGHT: 70 IN | OXYGEN SATURATION: 95 % | DIASTOLIC BLOOD PRESSURE: 82 MMHG | SYSTOLIC BLOOD PRESSURE: 130 MMHG | BODY MASS INDEX: 27.84 KG/M2 | HEART RATE: 71 BPM

## 2025-04-30 DIAGNOSIS — K59.09 OTHER CONSTIPATION: ICD-10-CM

## 2025-04-30 DIAGNOSIS — Z46.81 INSULIN PUMP TITRATION: ICD-10-CM

## 2025-04-30 DIAGNOSIS — E11.9 TYPE 2 DIABETES MELLITUS WITHOUT COMPLICATION, WITH LONG-TERM CURRENT USE OF INSULIN: Primary | Chronic | ICD-10-CM

## 2025-04-30 DIAGNOSIS — Z79.4 TYPE 2 DIABETES MELLITUS WITHOUT COMPLICATION, WITH LONG-TERM CURRENT USE OF INSULIN: Primary | Chronic | ICD-10-CM

## 2025-04-30 NOTE — ASSESSMENT & PLAN NOTE
Diabetes is controlled, but concerns for constipation with Ozempic use.  A1C is 7% today   Unable to download data from medtronic pump in office today.    Encouraged bolus before meals for improved post-prandial control.   Rx:   Decrease Ozempic 0.25 mg due to constipation.   Increase basal rate 0.825 u/hr; continue Medtronic pump  Continue Farxiga 10 mg daily    Cautioned risk for hypoglycemia; advised glucose supplement as needed.  Foot exam due 2/2026  Microalbumin due 2/2026  Eye exam encouraged yearly follow-up  LDL at goal <70; continue current regimen   BP near goal <130/80; continue current regimen    Discussed complications associated with diabetes.   Encouraged continued effort toward lifestyle management:         Increase lean protein and vegetable intake  Avoid concentrated sugar drinks, such as sodas, and processed carbs including crackers, cookies, cakes  Routine physical activity.

## 2025-05-01 PROBLEM — K59.09 OTHER CONSTIPATION: Status: ACTIVE | Noted: 2025-05-01

## 2025-05-01 RX ORDER — SEMAGLUTIDE 0.68 MG/ML
0.25 INJECTION, SOLUTION SUBCUTANEOUS WEEKLY
Start: 2025-05-01

## 2025-05-01 RX ORDER — DAPAGLIFLOZIN 10 MG/1
10 TABLET, FILM COATED ORAL DAILY
Start: 2025-05-01

## 2025-05-01 RX ORDER — INSULIN LISPRO 100 [IU]/ML
INJECTION, SOLUTION INTRAVENOUS; SUBCUTANEOUS
Qty: 36 ML | Refills: 1 | Status: SHIPPED | OUTPATIENT
Start: 2025-05-01

## 2025-05-01 NOTE — ASSESSMENT & PLAN NOTE
Likely Ozempic contributor.  Decrease to 0.25 mg to see if better tolerated.  Encouraged increase hydration, fiber intake.  Continue colace/miralax as needed.  Contact office if continues without relief

## 2025-05-06 ENCOUNTER — ANESTHESIA EVENT (OUTPATIENT)
Dept: PERIOP | Facility: HOSPITAL | Age: 75
End: 2025-05-06
Payer: MEDICARE

## 2025-05-06 ENCOUNTER — HOSPITAL ENCOUNTER (OUTPATIENT)
Facility: HOSPITAL | Age: 75
Setting detail: HOSPITAL OUTPATIENT SURGERY
Discharge: HOME | End: 2025-05-06
Attending: STUDENT IN AN ORGANIZED HEALTH CARE EDUCATION/TRAINING PROGRAM | Admitting: STUDENT IN AN ORGANIZED HEALTH CARE EDUCATION/TRAINING PROGRAM
Payer: MEDICARE

## 2025-05-06 ENCOUNTER — ANESTHESIA (OUTPATIENT)
Dept: PERIOP | Facility: HOSPITAL | Age: 75
End: 2025-05-06
Payer: MEDICARE

## 2025-05-06 VITALS
DIASTOLIC BLOOD PRESSURE: 76 MMHG | WEIGHT: 194 LBS | HEART RATE: 59 BPM | OXYGEN SATURATION: 96 % | BODY MASS INDEX: 27.77 KG/M2 | TEMPERATURE: 97.9 F | SYSTOLIC BLOOD PRESSURE: 120 MMHG | HEIGHT: 70 IN | RESPIRATION RATE: 16 BRPM

## 2025-05-06 DIAGNOSIS — N40.1 BPH WITH OBSTRUCTION/LOWER URINARY TRACT SYMPTOMS: Primary | ICD-10-CM

## 2025-05-06 DIAGNOSIS — N13.8 BPH WITH OBSTRUCTION/LOWER URINARY TRACT SYMPTOMS: Primary | ICD-10-CM

## 2025-05-06 LAB
ANION GAP SERPL CALCULATED.3IONS-SCNC: 12 MMOL/L (ref 5–15)
BUN SERPL-MCNC: 23 MG/DL (ref 8–23)
BUN/CREAT SERPL: 20.2 (ref 7–25)
CALCIUM SPEC-SCNC: 9.6 MG/DL (ref 8.6–10.5)
CHLORIDE SERPL-SCNC: 102 MMOL/L (ref 98–107)
CO2 SERPL-SCNC: 22 MMOL/L (ref 22–29)
CREAT SERPL-MCNC: 1.14 MG/DL (ref 0.76–1.27)
DEPRECATED RDW RBC AUTO: 51.4 FL (ref 37–54)
EGFRCR SERPLBLD CKD-EPI 2021: 66.2 ML/MIN/1.73
ERYTHROCYTE [DISTWIDTH] IN BLOOD BY AUTOMATED COUNT: 14.9 % (ref 12.3–15.4)
GLUCOSE BLDC GLUCOMTR-MCNC: 154 MG/DL (ref 70–130)
GLUCOSE SERPL-MCNC: 154 MG/DL (ref 65–99)
HCT VFR BLD AUTO: 42.5 % (ref 37.5–51)
HGB BLD-MCNC: 13.9 G/DL (ref 13–17.7)
MCH RBC QN AUTO: 30.5 PG (ref 26.6–33)
MCHC RBC AUTO-ENTMCNC: 32.7 G/DL (ref 31.5–35.7)
MCV RBC AUTO: 93.2 FL (ref 79–97)
PLATELET # BLD AUTO: 243 10*3/MM3 (ref 140–450)
PMV BLD AUTO: 9.5 FL (ref 6–12)
POTASSIUM SERPL-SCNC: 4.1 MMOL/L (ref 3.5–5.2)
RBC # BLD AUTO: 4.56 10*6/MM3 (ref 4.14–5.8)
SODIUM SERPL-SCNC: 136 MMOL/L (ref 136–145)
WBC NRBC COR # BLD AUTO: 7.14 10*3/MM3 (ref 3.4–10.8)

## 2025-05-06 PROCEDURE — 25810000003 LACTATED RINGERS PER 1000 ML: Performed by: ANESTHESIOLOGY

## 2025-05-06 PROCEDURE — 85027 COMPLETE CBC AUTOMATED: CPT | Performed by: STUDENT IN AN ORGANIZED HEALTH CARE EDUCATION/TRAINING PROGRAM

## 2025-05-06 PROCEDURE — 25010000002 DEXAMETHASONE PER 1 MG

## 2025-05-06 PROCEDURE — 25010000002 ONDANSETRON PER 1 MG

## 2025-05-06 PROCEDURE — 25010000002 LIDOCAINE PF 1% 1 % SOLUTION: Performed by: ANESTHESIOLOGY

## 2025-05-06 PROCEDURE — 80048 BASIC METABOLIC PNL TOTAL CA: CPT | Performed by: STUDENT IN AN ORGANIZED HEALTH CARE EDUCATION/TRAINING PROGRAM

## 2025-05-06 PROCEDURE — 25010000002 LEVOFLOXACIN PER 250 MG: Performed by: STUDENT IN AN ORGANIZED HEALTH CARE EDUCATION/TRAINING PROGRAM

## 2025-05-06 PROCEDURE — 25010000002 PROPOFOL 10 MG/ML EMULSION

## 2025-05-06 PROCEDURE — 82948 REAGENT STRIP/BLOOD GLUCOSE: CPT

## 2025-05-06 PROCEDURE — 25010000002 SUGAMMADEX 200 MG/2ML SOLUTION

## 2025-05-06 PROCEDURE — 25010000002 FENTANYL CITRATE (PF) 100 MCG/2ML SOLUTION

## 2025-05-06 RX ORDER — HYDROMORPHONE HYDROCHLORIDE 1 MG/ML
0.5 INJECTION, SOLUTION INTRAMUSCULAR; INTRAVENOUS; SUBCUTANEOUS
Status: DISCONTINUED | OUTPATIENT
Start: 2025-05-06 | End: 2025-05-06 | Stop reason: HOSPADM

## 2025-05-06 RX ORDER — DROPERIDOL 2.5 MG/ML
0.62 INJECTION, SOLUTION INTRAMUSCULAR; INTRAVENOUS
Status: DISCONTINUED | OUTPATIENT
Start: 2025-05-06 | End: 2025-05-06 | Stop reason: HOSPADM

## 2025-05-06 RX ORDER — FAMOTIDINE 20 MG/1
20 TABLET, FILM COATED ORAL ONCE
Status: COMPLETED | OUTPATIENT
Start: 2025-05-06 | End: 2025-05-06

## 2025-05-06 RX ORDER — NALOXONE HCL 0.4 MG/ML
0.4 VIAL (ML) INJECTION AS NEEDED
Status: DISCONTINUED | OUTPATIENT
Start: 2025-05-06 | End: 2025-05-06 | Stop reason: HOSPADM

## 2025-05-06 RX ORDER — DROPERIDOL 2.5 MG/ML
0.62 INJECTION, SOLUTION INTRAMUSCULAR; INTRAVENOUS ONCE AS NEEDED
Status: DISCONTINUED | OUTPATIENT
Start: 2025-05-06 | End: 2025-05-06 | Stop reason: HOSPADM

## 2025-05-06 RX ORDER — NITROFURANTOIN 25; 75 MG/1; MG/1
100 CAPSULE ORAL 2 TIMES DAILY
Qty: 6 CAPSULE | Refills: 0 | Status: SHIPPED | OUTPATIENT
Start: 2025-05-06

## 2025-05-06 RX ORDER — SODIUM CHLORIDE 0.9 % (FLUSH) 0.9 %
10 SYRINGE (ML) INJECTION EVERY 12 HOURS SCHEDULED
Status: CANCELLED | OUTPATIENT
Start: 2025-05-06

## 2025-05-06 RX ORDER — SODIUM CHLORIDE, SODIUM LACTATE, POTASSIUM CHLORIDE, CALCIUM CHLORIDE 600; 310; 30; 20 MG/100ML; MG/100ML; MG/100ML; MG/100ML
9 INJECTION, SOLUTION INTRAVENOUS CONTINUOUS
Status: DISCONTINUED | OUTPATIENT
Start: 2025-05-07 | End: 2025-05-06 | Stop reason: HOSPADM

## 2025-05-06 RX ORDER — MIDAZOLAM HYDROCHLORIDE 1 MG/ML
0.5 INJECTION, SOLUTION INTRAMUSCULAR; INTRAVENOUS
Status: DISCONTINUED | OUTPATIENT
Start: 2025-05-06 | End: 2025-05-06 | Stop reason: HOSPADM

## 2025-05-06 RX ORDER — ONDANSETRON 2 MG/ML
4 INJECTION INTRAMUSCULAR; INTRAVENOUS ONCE AS NEEDED
Status: DISCONTINUED | OUTPATIENT
Start: 2025-05-06 | End: 2025-05-06 | Stop reason: HOSPADM

## 2025-05-06 RX ORDER — FENTANYL CITRATE 50 UG/ML
INJECTION, SOLUTION INTRAMUSCULAR; INTRAVENOUS AS NEEDED
Status: DISCONTINUED | OUTPATIENT
Start: 2025-05-06 | End: 2025-05-06 | Stop reason: SURG

## 2025-05-06 RX ORDER — LIDOCAINE HYDROCHLORIDE 10 MG/ML
0.5 INJECTION, SOLUTION EPIDURAL; INFILTRATION; INTRACAUDAL; PERINEURAL ONCE AS NEEDED
Status: COMPLETED | OUTPATIENT
Start: 2025-05-06 | End: 2025-05-06

## 2025-05-06 RX ORDER — DEXAMETHASONE SODIUM PHOSPHATE 4 MG/ML
INJECTION, SOLUTION INTRA-ARTICULAR; INTRALESIONAL; INTRAMUSCULAR; INTRAVENOUS; SOFT TISSUE AS NEEDED
Status: DISCONTINUED | OUTPATIENT
Start: 2025-05-06 | End: 2025-05-06 | Stop reason: SURG

## 2025-05-06 RX ORDER — MAGNESIUM HYDROXIDE 1200 MG/15ML
LIQUID ORAL AS NEEDED
Status: DISCONTINUED | OUTPATIENT
Start: 2025-05-06 | End: 2025-05-06 | Stop reason: HOSPADM

## 2025-05-06 RX ORDER — BUPIVACAINE HCL/0.9 % NACL/PF 0.125 %
PLASTIC BAG, INJECTION (ML) EPIDURAL AS NEEDED
Status: DISCONTINUED | OUTPATIENT
Start: 2025-05-06 | End: 2025-05-06 | Stop reason: SURG

## 2025-05-06 RX ORDER — SODIUM CHLORIDE 0.9 % (FLUSH) 0.9 %
3-10 SYRINGE (ML) INJECTION AS NEEDED
Status: DISCONTINUED | OUTPATIENT
Start: 2025-05-06 | End: 2025-05-06 | Stop reason: HOSPADM

## 2025-05-06 RX ORDER — ROCURONIUM BROMIDE 10 MG/ML
INJECTION, SOLUTION INTRAVENOUS AS NEEDED
Status: DISCONTINUED | OUTPATIENT
Start: 2025-05-06 | End: 2025-05-06 | Stop reason: SURG

## 2025-05-06 RX ORDER — ONDANSETRON 2 MG/ML
INJECTION INTRAMUSCULAR; INTRAVENOUS AS NEEDED
Status: DISCONTINUED | OUTPATIENT
Start: 2025-05-06 | End: 2025-05-06 | Stop reason: SURG

## 2025-05-06 RX ORDER — PROMETHAZINE HYDROCHLORIDE 25 MG/1
25 TABLET ORAL ONCE AS NEEDED
Status: DISCONTINUED | OUTPATIENT
Start: 2025-05-06 | End: 2025-05-06 | Stop reason: HOSPADM

## 2025-05-06 RX ORDER — LABETALOL HYDROCHLORIDE 5 MG/ML
5 INJECTION, SOLUTION INTRAVENOUS
Status: DISCONTINUED | OUTPATIENT
Start: 2025-05-06 | End: 2025-05-06 | Stop reason: HOSPADM

## 2025-05-06 RX ORDER — SODIUM CHLORIDE 9 MG/ML
9 INJECTION, SOLUTION INTRAVENOUS AS NEEDED
Status: DISCONTINUED | OUTPATIENT
Start: 2025-05-06 | End: 2025-05-06 | Stop reason: HOSPADM

## 2025-05-06 RX ORDER — FAMOTIDINE 10 MG/ML
20 INJECTION, SOLUTION INTRAVENOUS ONCE
Status: CANCELLED | OUTPATIENT
Start: 2025-05-06 | End: 2025-05-06

## 2025-05-06 RX ORDER — HYOSCYAMINE SULFATE 0.12 MG/1
0.12 TABLET SUBLINGUAL EVERY 4 HOURS PRN
Qty: 30 TABLET | Refills: 1 | Status: SHIPPED | OUTPATIENT
Start: 2025-05-06

## 2025-05-06 RX ORDER — IPRATROPIUM BROMIDE AND ALBUTEROL SULFATE 2.5; .5 MG/3ML; MG/3ML
3 SOLUTION RESPIRATORY (INHALATION) ONCE AS NEEDED
Status: DISCONTINUED | OUTPATIENT
Start: 2025-05-06 | End: 2025-05-06 | Stop reason: HOSPADM

## 2025-05-06 RX ORDER — HYDRALAZINE HYDROCHLORIDE 20 MG/ML
5 INJECTION INTRAMUSCULAR; INTRAVENOUS
Status: DISCONTINUED | OUTPATIENT
Start: 2025-05-06 | End: 2025-05-06 | Stop reason: HOSPADM

## 2025-05-06 RX ORDER — OXYBUTYNIN CHLORIDE 5 MG/1
5 TABLET, EXTENDED RELEASE ORAL DAILY
Qty: 30 TABLET | Refills: 0 | Status: SHIPPED | OUTPATIENT
Start: 2025-05-06

## 2025-05-06 RX ORDER — PROPOFOL 10 MG/ML
VIAL (ML) INTRAVENOUS AS NEEDED
Status: DISCONTINUED | OUTPATIENT
Start: 2025-05-06 | End: 2025-05-06 | Stop reason: SURG

## 2025-05-06 RX ORDER — FENTANYL CITRATE 50 UG/ML
50 INJECTION, SOLUTION INTRAMUSCULAR; INTRAVENOUS
Status: DISCONTINUED | OUTPATIENT
Start: 2025-05-06 | End: 2025-05-06 | Stop reason: HOSPADM

## 2025-05-06 RX ORDER — HYDROCODONE BITARTRATE AND ACETAMINOPHEN 7.5; 325 MG/1; MG/1
1 TABLET ORAL EVERY 4 HOURS PRN
Status: DISCONTINUED | OUTPATIENT
Start: 2025-05-06 | End: 2025-05-06 | Stop reason: HOSPADM

## 2025-05-06 RX ORDER — SODIUM CHLORIDE 0.9 % (FLUSH) 0.9 %
10 SYRINGE (ML) INJECTION AS NEEDED
Status: CANCELLED | OUTPATIENT
Start: 2025-05-06

## 2025-05-06 RX ORDER — EPHEDRINE SULFATE 50 MG/ML
INJECTION INTRAVENOUS AS NEEDED
Status: DISCONTINUED | OUTPATIENT
Start: 2025-05-06 | End: 2025-05-06 | Stop reason: SURG

## 2025-05-06 RX ORDER — SODIUM CHLORIDE, SODIUM LACTATE, POTASSIUM CHLORIDE, CALCIUM CHLORIDE 600; 310; 30; 20 MG/100ML; MG/100ML; MG/100ML; MG/100ML
9 INJECTION, SOLUTION INTRAVENOUS CONTINUOUS
Status: DISCONTINUED | OUTPATIENT
Start: 2025-05-06 | End: 2025-05-06 | Stop reason: HOSPADM

## 2025-05-06 RX ORDER — HYDROCODONE BITARTRATE AND ACETAMINOPHEN 5; 325 MG/1; MG/1
1 TABLET ORAL ONCE AS NEEDED
Status: DISCONTINUED | OUTPATIENT
Start: 2025-05-06 | End: 2025-05-06 | Stop reason: HOSPADM

## 2025-05-06 RX ORDER — PROMETHAZINE HYDROCHLORIDE 25 MG/1
25 SUPPOSITORY RECTAL ONCE AS NEEDED
Status: DISCONTINUED | OUTPATIENT
Start: 2025-05-06 | End: 2025-05-06 | Stop reason: HOSPADM

## 2025-05-06 RX ORDER — SODIUM CHLORIDE 0.9 % (FLUSH) 0.9 %
3 SYRINGE (ML) INJECTION EVERY 12 HOURS SCHEDULED
Status: DISCONTINUED | OUTPATIENT
Start: 2025-05-06 | End: 2025-05-06 | Stop reason: HOSPADM

## 2025-05-06 RX ORDER — LEVOFLOXACIN 5 MG/ML
500 INJECTION, SOLUTION INTRAVENOUS ONCE
Status: COMPLETED | OUTPATIENT
Start: 2025-05-06 | End: 2025-05-06

## 2025-05-06 RX ADMIN — SUGAMMADEX 200 MG: 100 INJECTION, SOLUTION INTRAVENOUS at 13:29

## 2025-05-06 RX ADMIN — ONDANSETRON 4 MG: 2 INJECTION INTRAMUSCULAR; INTRAVENOUS at 13:02

## 2025-05-06 RX ADMIN — Medication 200 MCG: at 12:55

## 2025-05-06 RX ADMIN — LIDOCAINE HYDROCHLORIDE 0.5 ML: 10 INJECTION, SOLUTION EPIDURAL; INFILTRATION; INTRACAUDAL; PERINEURAL at 11:50

## 2025-05-06 RX ADMIN — Medication 100 MCG: at 12:51

## 2025-05-06 RX ADMIN — FAMOTIDINE 20 MG: 20 TABLET ORAL at 12:15

## 2025-05-06 RX ADMIN — EPHEDRINE SULFATE 10 MG: 50 INJECTION INTRAVENOUS at 13:03

## 2025-05-06 RX ADMIN — Medication 100 MCG: at 12:58

## 2025-05-06 RX ADMIN — FENTANYL CITRATE 50 MCG: 50 INJECTION, SOLUTION INTRAMUSCULAR; INTRAVENOUS at 12:49

## 2025-05-06 RX ADMIN — DEXAMETHASONE SODIUM PHOSPHATE 4 MG: 4 INJECTION INTRA-ARTICULAR; INTRALESIONAL; INTRAMUSCULAR; INTRAVENOUS; SOFT TISSUE at 13:02

## 2025-05-06 RX ADMIN — LEVOFLOXACIN 500 MG: 5 INJECTION, SOLUTION INTRAVENOUS at 12:57

## 2025-05-06 RX ADMIN — PROPOFOL 140 MG: 10 INJECTION, EMULSION INTRAVENOUS at 12:51

## 2025-05-06 RX ADMIN — SODIUM CHLORIDE, SODIUM LACTATE, POTASSIUM CHLORIDE, CALCIUM CHLORIDE 9 ML/HR: 20; 30; 600; 310 INJECTION, SOLUTION INTRAVENOUS at 11:50

## 2025-05-06 RX ADMIN — EPHEDRINE SULFATE 10 MG: 50 INJECTION INTRAVENOUS at 12:55

## 2025-05-06 RX ADMIN — FENTANYL CITRATE 50 MCG: 50 INJECTION, SOLUTION INTRAMUSCULAR; INTRAVENOUS at 12:51

## 2025-05-06 RX ADMIN — PROPOFOL 30 MG: 10 INJECTION, EMULSION INTRAVENOUS at 12:53

## 2025-05-06 RX ADMIN — ROCURONIUM BROMIDE 50 MG: 10 INJECTION INTRAVENOUS at 12:53

## 2025-05-06 NOTE — ANESTHESIA PROCEDURE NOTES
Airway  Reason: elective    Date/Time: 5/6/2025 12:55 PM  Airway not difficult    General Information and Staff    Patient location during procedure: OR  Anesthesiologist: Zamzam Adames DO    Indications and Patient Condition  Indications for airway management: airway protection    Preoxygenated: yes  MILS not maintained throughout    Mask difficulty assessment: 1 - vent by mask    Final Airway Details    Final airway type: endotracheal airway      Successful airway: ETT  Cuffed: yes   Successful intubation technique: direct laryngoscopy  Endotracheal tube insertion site: oral  Blade: Sebastian  Blade size: 4  ETT size (mm): 8.0  Cormack-Lehane Classification: grade I - full view of glottis  Placement verified by: chest auscultation and capnometry   Measured from: lips  ETT/EBT  to lips (cm): 20  Number of attempts at approach: 1  Assessment: lips, teeth, and gum same as pre-op and atraumatic intubation

## 2025-05-06 NOTE — DISCHARGE INSTRUCTIONS
Laser Ablation of Prostate Post-Operative Care/Expectations     Please follow these guidelines after your procedure in order to assist with your recovery.     Anesthesia Precautions and Expectations  - Rest for 24 hours after receiving general anesthesia, make sure you have someone at home with you that can monitor you  - Do not operate a vehicle, drink alcohol, or make 'important decisions'/sign legal documentation during the immediate recovery period if you received sedation for your procedure  - You may experience a sore throat, jaw discomfort, or muscle aches related to anesthesia, these symptoms may last a few days    Activity  - Light activity is encouraged for 1 week to prevent urinary bleeding  - Avoid lifting heavy objects more than 20 lbs, running, or endurance exercise for 1 week   - Do not operate a vehicle or drink alcohol if you were prescribed narcotic pain medications     Bathing/Showering  - You may resume normal bathing and showering post-procedure    Pain/Urinary Symptoms  - You may experience burning urinary pain for a few days, and/or increased urinary urgency/frequency post-procedure for a few weeks which is expected  - A medication to treat burning urinary pain (Phenazopyridine) may be prescribed by your doctor, take as directed  - A medication to prevent urinary urgency/frequency (sometimes referred to as “bladder spasms”) (Hyoscyamine, Oxybutynin, Mirabegron, Solifenacin, etc.) may be prescribed by your doctor for up to 1 month, take as directed     Urinary Bleeding (Hematuria)   - Some degree of light urinary bleeding (hematuria) is expected for up to 1-2 weeks (this may be as light as pink lemonade or somewhat darker like clear/pale red Gatorade); a good rule of thumb is that your urine should remain see-through    - If you experience heavy urinary bleeding (like the color and consistency of tomato juice, or red wine), large blood clots, or you are unable to urinate for more than 8 hours  you should contact your doctor and present to the nearest Emergency Department  - Drink plenty of water at home and stay hydrated, as this will help naturally flush out your bladder and urethra    Sexual Activity  - Refrain from sexual activity and ejaculation for 1 week while you are healing which may help prevent pain and bleeding    Osborn Catheter   - You may be sent home with a urethral catheter sometimes for up to 1 week post-procedure; catheter specific instructions are as follows:   - Do not attempt to remove your urinary catheter (unless explicitly instructed by your doctor with at home catheter removal instructions/equipment)  - If you feel that your catheter is not working the right way, call your doctor   - Keep your skin and catheter clean, clean the skin around your catheter at least two times each day, clean your skin  and catheter after every bowel movement  - Always keep your urine collection bag below the level of your bladder; keeping the bag below your bladder will help keep your urine from flowing back into your bladder from urine collection bag, which may cause infection     - Drink plenty of liquids, at least 6-8 glasses of healthy liquids or water each day which will help flush out your bladder  - Do not attempt sexual intercourse while you have a catheter in place  - Do not tug or pull on your catheter tubing. This can cause you to bleed and hurt your urethra. Do not step on the tubing when you walk. Always keep the catheter secured to your inner leg with either leg-tape provided, the special catheter sticker/securing device, or leg strap            When to Call Your Doctor  - Severe pain not controlled by oral medications  - Temperature above 101 F degrees  - Severe urinary bleeding or large blood clots in urine  - Inability to urinate for more than 8 hours post-surgery

## 2025-05-06 NOTE — NURSING NOTE
.Caregiver Telephone Number    Phone Number for Ride/Caregiver: TIERA PAL (SPOUSE) [phone] (MOBILE)     Who will be staying with you post procedure for the next 24 hours? Name and Phone Number?: TIERA PAL (SPOUSE) [phone] (MOBILE)

## 2025-05-06 NOTE — OP NOTE
CYSTOSCOPY TRANSURETHRAL RESECTION OF PROSTATE GREENLIGHT  Procedure Report    Patient Name:  Neno Green  YOB: 1950    Date of Surgery:  5/6/2025     Indications: 74 yo M with significant BPH, history of urinary retention, and concurrent bladder outlet obstruction (noted on prior office workup including cysto, prostate sizing, and Uroflow/PVR measurements), presents for Greenlight laser ablation of prostate. Informed consent was reviewed and signed after discussion of risks, benefits, and alternatives.     Pre-op Diagnosis:   BPH with obstruction/lower urinary tract symptoms [N40.1, N13.8]       Post-Op Diagnosis Codes:     * BPH with obstruction/lower urinary tract symptoms [N40.1, N13.8]      Procedure(s):  CYSTOSCOPY TRANSURETHRAL RESECTION OF PROSTATE GREENLIGHT  [place] CATHETER PLACEMENT     Staff:  Surgeon(s):  Bang Mora MD         Anesthesia: General    Estimated Blood Loss: minimal    Implants:    Nothing was implanted during the procedure    Specimen:          None        Findings:   No bladder tumors or lesions noted  Significant lateral lobe and moderate intravesical median lobe protrusion and elevated bladder neck  Uncomplicated Greenlight PVP with widely patent prostatic fossa and bladder outlet at conclusion of procedure    Complications: None    Catheter: 22 Fr 3-way, CBI port plugged    Description of Procedure:   After informed consent was obtained, the patient was taken to the operating room and placed supine on the operating table and general anesthesia was induced.     He was repositioned to the dorsal lithotomy position and prepped and draped in a standard sterile fashion.     Preoperative antibiotics were given and a multidisciplinary time-out was taken.      Next, a 24-Syriac continuous flow [employer] Greenlight laser scope with a visual obturator was advanced through the urethra and into the bladder.     The prostatic urethra was notable for  significant lateral lobe coaptation at midline and a moderate median lobe in addition patient has an elevated bladder neck.     Panendoscopy revealed no bladder tumors or lesions and both ureteral orifices were identified.     The visual obturator was removed and replaced with a laser bridge. A Greenlight laser fiber was advanced through the working channel of the scope, and two vaporization incisions were made at the 5 and 7 o'clock positions at the bladder neck, and then taken down to just proximal to the verumontanum, sparing the urethral sphincter.     The median lobe was then vaporized to prostatic capsule fibers while avoiding undermining the bladder neck.     Next, the right lateral lobe was vaporized until widely patent, followed by the left. Bleeding vessels and open venous channels encountered were coagulated with laser with excellent hemostasis.     There was no significant prostatic specimen available to send to pathology after completion of vaporization. The bladder was emptied.     A 22 Fr 3-way melendez catheter was then inserted into the bladder without difficulty and 40 ml of sterile water was used to fill the balloon.     CBI port was capped given excellent hemostasis and clear urine noted in catheter tubing. The catheter was secured to the patient's thigh.     The patient was brought to PACU in stable condition. The patient tolerated the procedure well and there were no immediate complications.    Disposition  Return to the office on Thursday for cath removal and voiding trial.        Bang Mora MD     Date: 5/6/2025  Time: 13:35 EDT

## 2025-05-06 NOTE — ANESTHESIA PREPROCEDURE EVALUATION
Anesthesia Evaluation     Patient summary reviewed and Nursing notes reviewed   no history of anesthetic complications:   NPO Solid Status: > 8 hours  NPO Liquid Status: > 8 hours           Airway   Mallampati: II  TM distance: >3 FB  Neck ROM: full  No difficulty expected  Dental      Pulmonary - normal exam   (+) asthma,  Cardiovascular - normal exam    (+) hypertension, past MI , hyperlipidemia      Neuro/Psych  (+) headaches, psychiatric history  GI/Hepatic/Renal/Endo    (+) GERD, diabetes mellitus, thyroid problem     Musculoskeletal     Abdominal    Substance History      OB/GYN          Other   arthritis,                 Anesthesia Plan    ASA 3     general     intravenous induction     Anesthetic plan, risks, benefits, and alternatives have been provided, discussed and informed consent has been obtained with: patient.    Plan discussed with CRNA.    CODE STATUS:

## 2025-05-06 NOTE — H&P
Pre-Op H&P  Neno Green  [id number]  1950      Chief complaint: Urinary difficulty      Subjective:  Patient is a 75 y.o.male presents for scheduled surgery by Dr. Mora. He anticipates a CYSTOSCOPY TRANSURETHRAL RESECTION OF PROSTATE GREENLIGHT  today. Urinary symptoms include weak stream, urgency, frequency, nocturia, sensation of incomplete bladder emptying. He tried flomax with minimal symptom relief.      Review of Systems:  Constitutional-- No fever, chills or sweats. No fatigue.  CV-- No chest pain, palpitation or syncope. +HTN, HLD  Resp-- No SOB, cough, hemoptysis  Skin--No rashes or lesions      Allergies:   Allergies   Allergen Reactions    Keflex [Cephalexin] Hives         Home Meds:  Medications Prior to Admission   Medication Sig Dispense Refill Last Dose/Taking    amiodarone (PACERONE) 400 MG tablet Take 1 tablet by mouth Every Night.       carvedilol (COREG) 3.125 MG tablet Take 1 tablet by mouth 2 (Two) Times a Day With Meals.       Cinnamon 500 MG capsule Take  by mouth.       dapagliflozin Propanediol (Farxiga) 10 MG tablet Take 10 mg by mouth Daily. Patient assistance program       docusate sodium (COLACE) 100 MG capsule Take 1 capsule by mouth Daily.       fenofibrate (TRICOR) 145 MG tablet Take 1 tablet by mouth Daily.       fluticasone (FLONASE) 50 MCG/ACT nasal spray SHAKE LQ AND U 2 SPRAYS IEN D  3     glucose blood test strip Use to check BG 3x/day dx e11.9 100 each 11     glucose monitor monitoring kit Use to check BG 3x/day 1 each 0     Insulin Infusion Pump (MiniMed 670G Insulin Pump) device        Insulin Lispro (HumaLOG) 100 UNIT/ML injection MDD of 40 units Via pump daily 36 mL 1     isosorbide mononitrate (IMDUR) 30 MG 24 hr tablet Take 1 tablet by mouth Daily.       Lancets misc Use 1 each 3 times a day. 100 each 11     levothyroxine (SYNTHROID, LEVOTHROID) 125 MCG tablet Take 1 tablet by mouth Every Morning Before Breakfast.       loratadine (CLARITIN) 10 MG tablet  Take 1 tablet by mouth Daily.       losartan (COZAAR) 50 MG tablet Take 1 tablet by mouth Daily.       nitroglycerin (NITROSTAT) 0.4 MG SL tablet Place 1 tablet under the tongue Every 5 (Five) Minutes As Needed for Chest Pain. Take no more than 3 doses in 15 minutes.       omeprazole (priLOSEC) 40 MG capsule Take 1 capsule by mouth As Needed.       ondansetron (ZOFRAN) 4 MG tablet Take 1 tablet by mouth Daily.       pravastatin (PRAVACHOL) 40 MG tablet Take 1 tablet by mouth Daily.       Semaglutide,0.25 or 0.5MG/DOS, (Ozempic, 0.25 or 0.5 MG/DOSE,) 2 MG/3ML solution pen-injector Inject 0.25 mg under the skin into the appropriate area as directed 1 (One) Time Per Week. Patient assistance program       Sodium Fluoride 5000 PPM 1.1 % paste APPLY PEA SIZED AMOUNT TO TOOTHBRUSH AND BRUSH TEETH BEFORE BEDTIME. DO NOT EAT OR DRINK FOLLOWING APPLICATION FOR AT LEAST 30 MINUTES       tamsulosin (FLOMAX) 0.4 MG capsule 24 hr capsule Take 1 capsule by mouth Daily.       XARELTO 20 MG tablet Take 1 tablet by mouth Daily.  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    Heart attack     Heart disease     History of transfusion     2007 in [place]; 2008 in [place]; no reactions    Hyperlipidemia 2007    After triple bypass    Hypertension     Hypothyroidism 1/15/24    Taking Levothyroxine 0.05 mg    Migraine     Testosterone deficiency 2014    Type 2 diabetes mellitus 1998     PSH:    Past Surgical History:   Procedure Laterality Date    APPENDECTOMY      CARDIAC SURGERY      CHOLECYSTECTOMY N/A 11/2024    LAMINECTOMY      PACEMAKER IMPLANTATION N/A 12/2024       Immunization History:  Influenza: UTD  Pneumococcal: UTD  Tetanus: UTD    Social History:   Tobacco:   Social History     Tobacco Use   Smoking Status Former   Smokeless Tobacco Never   Tobacco Comments    Teenager      Alcohol:     Social History     Substance and Sexual  Activity   Alcohol Use Never         Physical Exam: VS: /80  HR 64  RR 16  T 297.6 Sat 95%RA      General Appearance:    Alert, cooperative, no distress, appears stated age   Head:    Normocephalic, without obvious abnormality, atraumatic   Lungs:     Clear to auscultation bilaterally, respirations unlabored    Heart:   Regular rate and rhythm, S1 and S2 normal    Abdomen:    Soft without tenderness   Extremities:   Extremities normal, atraumatic, no cyanosis or edema   Skin:   Skin color, texture, turgor normal, no rashes or lesions   Neurologic:   Grossly intact     Results Review:     LABS:  Lab Results   Component Value Date    WBC 5.96 04/29/2025    HGB 13.7 04/29/2025    HCT 42.0 04/29/2025    MCV 93.1 04/29/2025     04/29/2025    NEUTROABS 5.1 08/01/2017    GLUCOSE 92 04/29/2025    BUN 27 (H) 04/29/2025    CREATININE 1.51 (H) 04/29/2025    EGFRIFNONA 48 (L) 02/17/2021    EGFRIFAFRI 56 (L) 02/17/2021     04/29/2025    K 4.6 04/29/2025     04/29/2025    CO2 22.0 04/29/2025    CALCIUM 9.5 04/29/2025    ALBUMIN 4.5 02/17/2021    AST 25 02/17/2021    ALT 15 02/17/2021    BILITOT 0.6 02/17/2021       RADIOLOGY:  Imaging Results (Last 72 Hours)       ** No results found for the last 72 hours. **            I reviewed the patient's new clinical results.    Cancer Staging (if applicable)  Cancer Patient: __ yes __no __unknown; If yes, clinical stage T:__ N:__M:__, stage group or __N/A      Impression: BPH with obstruction/lower urinary tract symptoms       Plan: CYSTOSCOPY TRANSURETHRAL RESECTION OF PROSTATE HEATH [place]   5/6/2025   11:54 EDT

## 2025-05-06 NOTE — ANESTHESIA POSTPROCEDURE EVALUATION
Patient: Neno Green    Procedure Summary       Date: 05/06/25 Room / Location:  [facility]    Anesthesia Start: 1247 Anesthesia Stop: 1345    Procedure: CYSTOSCOPY TRANSURETHRAL RESECTION OF PROSTATE GREENLIGHT (Bladder) Diagnosis:       BPH with obstruction/lower urinary tract symptoms      (BPH with obstruction/lower urinary tract symptoms [N40.1, N13.8])    Surgeons: Bang Mora MD Provider: Zamzam Adames DO    Anesthesia Type: general ASA Status: 3            Anesthesia Type: general    Vitals  Vitals Value Taken Time   /75 05/06/25 13:41   Temp 98.4 °F (36.9 °C) 05/06/25 13:41   Pulse 64 05/06/25 13:44   Resp 14 05/06/25 13:41   SpO2 97 % 05/06/25 13:44   Vitals shown include unfiled device data.        Post Anesthesia Care and Evaluation    Patient location during evaluation: PACU  Patient participation: complete - patient participated  Level of consciousness: awake  Pain management: adequate    Airway patency: patent  Anesthetic complications: No anesthetic complications  PONV Status: none  Cardiovascular status: hemodynamically stable and acceptable  Respiratory status: nonlabored ventilation, acceptable and nasal cannula  Hydration status: acceptable

## 2025-05-08 ENCOUNTER — CLINICAL SUPPORT (OUTPATIENT)
Dept: UROLOGY | Facility: CLINIC | Age: 75
End: 2025-05-08
Payer: MEDICARE

## 2025-05-08 DIAGNOSIS — N13.8 BPH WITH OBSTRUCTION/LOWER URINARY TRACT SYMPTOMS: Primary | ICD-10-CM

## 2025-05-08 DIAGNOSIS — N40.1 BPH WITH OBSTRUCTION/LOWER URINARY TRACT SYMPTOMS: Primary | ICD-10-CM

## 2025-05-08 PROCEDURE — 99211 OFF/OP EST MAY X REQ PHY/QHP: CPT | Performed by: STUDENT IN AN ORGANIZED HEALTH CARE EDUCATION/TRAINING PROGRAM

## 2025-05-08 NOTE — PROGRESS NOTES
PT presented to our office for melendez catheter removal and voiding trial. PT's melendez catheter was draining yellow urine without Clots. I first explained to the PT what I will be doing throughout the voiding trial and answered any questions PT  may have. Then I removed the bag from the catheter and using a piston syringe, inserted 180 mL of sterile water into the PT's bladder. When PT notified me they began to feel a strong urge to urinate, I deflated the catheter balloon holding 40 mL of water. Once the balloon was empty, I removed the catheter in a smooth and gentle movement. I instructed the PT to try and urinate into the hand-held urinal . PT was able to void 250 mL of pinkish urine. PT tolerated well. I advised PT  to drink lots of water, rest, call us if they are suddenly unable to urinate, or if large amounts of blood/clots are suddenly in urine. PT verbalized understanding.      ===  MA note reviewed    Bang Mora MD

## 2025-05-14 ENCOUNTER — TELEPHONE (OUTPATIENT)
Dept: UROLOGY | Facility: CLINIC | Age: 75
End: 2025-05-14
Payer: MEDICARE

## 2025-05-14 NOTE — TELEPHONE ENCOUNTER
Called and LVM to see if PT could come in tomorrow to see if he is urinating okay. We would get a urine sample and do a bladder ultrasound to see if he is emptying or not.

## 2025-05-15 ENCOUNTER — CLINICAL SUPPORT (OUTPATIENT)
Dept: UROLOGY | Facility: CLINIC | Age: 75
End: 2025-05-15
Payer: MEDICARE

## 2025-05-15 DIAGNOSIS — N40.1 BPH WITH OBSTRUCTION/LOWER URINARY TRACT SYMPTOMS: Primary | ICD-10-CM

## 2025-05-15 DIAGNOSIS — N13.8 BPH WITH OBSTRUCTION/LOWER URINARY TRACT SYMPTOMS: Primary | ICD-10-CM

## 2025-06-17 ENCOUNTER — TELEPHONE (OUTPATIENT)
Dept: ENDOCRINOLOGY | Facility: CLINIC | Age: 75
End: 2025-06-17
Payer: MEDICARE

## 2025-06-17 ENCOUNTER — OFFICE VISIT (OUTPATIENT)
Dept: UROLOGY | Facility: CLINIC | Age: 75
End: 2025-06-17
Payer: MEDICARE

## 2025-06-17 DIAGNOSIS — N40.1 BPH WITH OBSTRUCTION/LOWER URINARY TRACT SYMPTOMS: Primary | ICD-10-CM

## 2025-06-17 DIAGNOSIS — N32.81 OVERACTIVE BLADDER: ICD-10-CM

## 2025-06-17 DIAGNOSIS — N13.8 BPH WITH OBSTRUCTION/LOWER URINARY TRACT SYMPTOMS: Primary | ICD-10-CM

## 2025-06-17 DIAGNOSIS — N39.41 URGENCY INCONTINENCE: ICD-10-CM

## 2025-06-17 PROCEDURE — 1160F RVW MEDS BY RX/DR IN RCRD: CPT | Performed by: STUDENT IN AN ORGANIZED HEALTH CARE EDUCATION/TRAINING PROGRAM

## 2025-06-17 PROCEDURE — 1159F MED LIST DOCD IN RCRD: CPT | Performed by: STUDENT IN AN ORGANIZED HEALTH CARE EDUCATION/TRAINING PROGRAM

## 2025-06-17 PROCEDURE — 99024 POSTOP FOLLOW-UP VISIT: CPT | Performed by: STUDENT IN AN ORGANIZED HEALTH CARE EDUCATION/TRAINING PROGRAM

## 2025-06-17 NOTE — PROGRESS NOTES
Follow Up Office Visit      Patient Name: Neno Green  : 1950   MRN: [id number]     Chief Complaint:    Chief Complaint   Patient presents with    BPH with obstruction/lower urinary tract symptoms       Referring Provider: No ref. provider found    History of Present Illness: Neno Green is a 75 y.o. male who presents today for follow up of BPH with chronic lower urinary tract symptoms previously managed with tamsulosin who underwent a greenlight PVP mid May 2025.  Here today for 6-week follow-up.  IPSS is 14 with a mixed quality of life.  His new symptom is urgency related incontinence.  He is not using pads but he is struggling sometimes to get to the restroom in time.  His stream is significantly improved.  He has continue tamsulosin which he will discontinue today.  He drinks 3 cups of coffee in the morning and we discussed caffeine will likely irritate his urgency symptoms.  He denies hematuria UTI symptoms or urinary retention.    Subjective      Review of System: Review of Systems   Genitourinary:  Positive for enuresis, frequency and urgency. Negative for decreased urine volume, difficulty urinating, dysuria, flank pain and hematuria.      I have reviewed the ROS documented by my clinical staff, I have updated appropriately and I agree. Bang Mora MD    I have reviewed and the following portions of the patient's history were updated as appropriate: past family history, past medical history, past social history, past surgical history and problem list.    Medications:     Current Outpatient Medications:     amiodarone (PACERONE) 400 MG tablet, Take 1 tablet by mouth Every Night., Disp: , Rfl:     carvedilol (COREG) 3.125 MG tablet, Take 1 tablet by mouth 2 (Two) Times a Day With Meals., Disp: , Rfl:     Cinnamon 500 MG capsule, Take  by mouth., Disp: , Rfl:     dapagliflozin Propanediol (Farxiga) 10 MG tablet, Take 10 mg by mouth Daily. Patient assistance program, Disp: ,  Rfl:     docusate sodium (COLACE) 100 MG capsule, Take 1 capsule by mouth Daily., Disp: , Rfl:     fenofibrate (TRICOR) 145 MG tablet, Take 1 tablet by mouth Daily., Disp: , Rfl:     fluticasone (FLONASE) 50 MCG/ACT nasal spray, SHAKE LQ AND U 2 SPRAYS IEN D, Disp: , Rfl: 3    glucose blood test strip, Use to check BG 3x/day dx e11.9, Disp: 100 each, Rfl: 11    glucose monitor monitoring kit, Use to check BG 3x/day, Disp: 1 each, Rfl: 0    Insulin Infusion Pump (MiniMed 670G Insulin Pump) device, , Disp: , Rfl:     Insulin Lispro (HumaLOG) 100 UNIT/ML injection, MDD of 40 units Via pump daily, Disp: 36 mL, Rfl: 1    isosorbide mononitrate (IMDUR) 30 MG 24 hr tablet, Take 1 tablet by mouth Daily., Disp: , Rfl:     Lancets misc, Use 1 each 3 times a day., Disp: 100 each, Rfl: 11    levothyroxine (SYNTHROID, LEVOTHROID) 125 MCG tablet, Take 1 tablet by mouth Every Morning Before Breakfast., Disp: , Rfl:     loratadine (CLARITIN) 10 MG tablet, Take 1 tablet by mouth Daily., Disp: , Rfl:     losartan (COZAAR) 50 MG tablet, Take 1 tablet by mouth Daily., Disp: , Rfl:     omeprazole (priLOSEC) 40 MG capsule, Take 1 capsule by mouth As Needed., Disp: , Rfl:     pravastatin (PRAVACHOL) 40 MG tablet, Take 0.5 tablets by mouth Daily., Disp: , Rfl:     Semaglutide,0.25 or 0.5MG/DOS, (Ozempic, 0.25 or 0.5 MG/DOSE,) 2 MG/3ML solution pen-injector, Inject 0.25 mg under the skin into the appropriate area as directed 1 (One) Time Per Week. Patient assistance program, Disp: , Rfl:     Sodium Fluoride 5000 PPM 1.1 % paste, APPLY PEA SIZED AMOUNT TO TOOTHBRUSH AND BRUSH TEETH BEFORE BEDTIME. DO NOT EAT OR DRINK FOLLOWING APPLICATION FOR AT LEAST 30 MINUTES, Disp: , Rfl:     XARELTO 20 MG tablet, Take 1 tablet by mouth Daily., Disp: , Rfl: 2    nitroglycerin (NITROSTAT) 0.4 MG SL tablet, Place 1 tablet under the tongue Every 5 (Five) Minutes As Needed for Chest Pain. Take no more than 3 doses in 15 minutes. (Patient not taking: Reported  on 6/17/2025), Disp: , Rfl:     ondansetron (ZOFRAN) 4 MG tablet, Take 1 tablet by mouth Daily. (Patient not taking: Reported on 6/17/2025), Disp: , Rfl:     Vibegron 75 MG tablet, Take 1 tablet by mouth Daily., Disp: 28 tablet, Rfl: 0    Allergies:   Allergies   Allergen Reactions    Keflex [Cephalexin] Hives       IPSS Questionnaire (AUA-7):  Over the past month…    1)  Incomplete Emptying:       How often have you had a sensation of not emptying you had the sensation of not emptying your bladder completely after you finished urinating?  2 - Less than half the time   2)  Frequency:       How often have you had the urinate again less than two hours after you finished urinating?  2 - Less than half the time   3)  Intermittency:       How often have you found you stopped and started again several times when you urinated?   3 - About half the time   4) Urgency:      How often have you found it difficult to postpone urination?  3 - About half the time   5) Weak Stream:      How often have you had a weak urinary stream?  1 - Less than 1 time in 5   6) Straining:       How often have you had to push or strain to begin urination?  1 - Less than 1 time in 5   7) Nocturia:      How many times did you most typically get up to urinate from the time you went to bed at night until the time you got up in the morning?  2 - 2 times   Total Score:  14   The International Prostate Symptom Score (IPSS) is used to screen, diagnose, track symptoms of benign prostatic hyperplasia (BPH).   0-7 (Mild Symptoms) 8-19 (Moderate) 20-35 (Severe)   Quality of Life (QoL):  If you were to spend the rest of your life with your urinary condition just the way it is now, how would you feel about that? 3-Mixed   Urine Leakage (Incontinence) 1-Mild (A few drops a day, no pad use)         Post void residual bladder scan:   72 mL    Objective     Physical Exam:   Vital Signs: There were no vitals filed for this visit.  There is no height or weight on  file to calculate BMI.     Physical Exam  Constitutional:       Appearance: Normal appearance.   HENT:      Head: Normocephalic and atraumatic.      Nose: Nose normal.   Eyes:      Extraocular Movements: Extraocular movements intact.      Conjunctiva/sclera: Conjunctivae normal.      Pupils: Pupils are equal, round, and reactive to light.   Musculoskeletal:         General: Normal range of motion.      Cervical back: Normal range of motion and neck supple.   Skin:     General: Skin is warm and dry.      Findings: No lesion or rash.   Neurological:      General: No focal deficit present.      Mental Status: He is alert and oriented to person, place, and time. Mental status is at baseline.   Psychiatric:         Mood and Affect: Mood normal.         Behavior: Behavior normal.         Labs:   Brief Urine Lab Results  (Last result in the past 365 days)        Color   Clarity   Blood   Leuk Est   Nitrite   Protein   CREAT   Urine HCG        03/11/25 1547 Yellow   Cloudy   Small (1+)   Moderate (2+)   Negative   Negative                   Urine Culture          3/11/2025    15:47   Urine Culture   Urine Culture >100,000 CFU/mL Raoultella planticola         Lab Results   Component Value Date    GLUCOSE 154 (H) 05/06/2025    CALCIUM 9.6 05/06/2025     05/06/2025    K 4.1 05/06/2025    CO2 22.0 05/06/2025     05/06/2025    BUN 23 05/06/2025    CREATININE 1.14 05/06/2025    EGFRIFAFRI 56 (L) 02/17/2021    EGFRIFNONA 48 (L) 02/17/2021    BCR 20.2 05/06/2025    ANIONGAP 12.0 05/06/2025       Lab Results   Component Value Date    WBC 7.14 05/06/2025    HGB 13.9 05/06/2025    HCT 42.5 05/06/2025    MCV 93.2 05/06/2025     05/06/2025       Images:   No Images in the past 120 days found..    Measures:   Tobacco:   Neno Green  reports that he has quit smoking. He has never used smokeless tobacco.     Assessment / Plan      Assessment/Plan:   75 y.o. male who presented today for follow up of BPH status post  greenlight PVP.  Now dealing with some postoperative urgency frequency which is expected.  I will trial him on Gemtesa for the next 4 weeks to get him through his initial healing.  And I have recommended he discontinue or significantly reduce his caffeine intake which will exacerbate his urgency related incontinence.  He will see me back in 6 months with a PSA at that time.    Diagnoses and all orders for this visit:    1. BPH with obstruction/lower urinary tract symptoms (Primary)    2. Overactive bladder  -     Vibegron 75 MG tablet; Take 1 tablet by mouth Daily.  Dispense: 28 tablet; Refill: 0    3. Urgency incontinence  -     Vibegron 75 MG tablet; Take 1 tablet by mouth Daily.  Dispense: 28 tablet; Refill: 0           Follow Up:   Return in about 6 months (around 12/17/2025).    I spent approximately 20 minutes providing clinical care for this patient; including review of patient's chart and provider documentation, face to face time spent with patient in examination room (obtaining history, performing physical exam, discussing diagnosis and management options), placing orders, and completing patient documentation.     Bang Mora MD  [facility] [place]

## 2025-07-25 DIAGNOSIS — E11.9 TYPE 2 DIABETES MELLITUS WITHOUT COMPLICATION, WITH LONG-TERM CURRENT USE OF INSULIN: Chronic | ICD-10-CM

## 2025-07-25 DIAGNOSIS — Z79.4 TYPE 2 DIABETES MELLITUS WITHOUT COMPLICATION, WITH LONG-TERM CURRENT USE OF INSULIN: Chronic | ICD-10-CM

## 2025-07-28 RX ORDER — BLOOD-GLUCOSE METER
EACH MISCELLANEOUS
Qty: 1 EACH | OUTPATIENT
Start: 2025-07-28

## 2025-07-28 NOTE — TELEPHONE ENCOUNTER
Rx Refill Note  Requested Prescriptions     Pending Prescriptions Disp Refills    Blood Glucose Monitoring Suppl (ONE TOUCH ULTRA 2) w/Device kit [Pharmacy Med Name: ONE TOUCH ULTRA 2 KIT] 1 each      Sig: USE TO TEST BLOOD GLUCOSE THREE TIMES DAILY      Last office visit with prescribing clinician: 2/12/2025      Next office visit with prescribing clinician: 09/17/2025    Denied. Patient already received kit.  {  Ceci Jaramillo MA  07/28/25, 09:18 EDT

## 2025-08-14 ENCOUNTER — OFFICE VISIT (OUTPATIENT)
Dept: CARDIOLOGY | Facility: CLINIC | Age: 75
End: 2025-08-14
Payer: MEDICARE

## 2025-08-14 VITALS
BODY MASS INDEX: 28.77 KG/M2 | OXYGEN SATURATION: 93 % | DIASTOLIC BLOOD PRESSURE: 82 MMHG | SYSTOLIC BLOOD PRESSURE: 132 MMHG | HEART RATE: 54 BPM | WEIGHT: 201 LBS | HEIGHT: 70 IN

## 2025-08-14 DIAGNOSIS — I44.7 LBBB (LEFT BUNDLE BRANCH BLOCK): Chronic | ICD-10-CM

## 2025-08-14 DIAGNOSIS — I44.1 AV BLOCK, MOBITZ II: Primary | Chronic | ICD-10-CM

## 2025-08-14 DIAGNOSIS — I49.5 SSS (SICK SINUS SYNDROME): Chronic | ICD-10-CM

## 2025-08-14 DIAGNOSIS — I50.22 HEART FAILURE WITH MILDLY REDUCED EJECTION FRACTION (HFMREF): Chronic | ICD-10-CM

## (undated) DEVICE — GOWN,SIRUS,NONRNF,SETINSLV,2XL,18/CS: Brand: MEDLINE

## (undated) DEVICE — FIBR LASR MOXY XPS GREENLIGHT

## (undated) DEVICE — CVR FTSWITCH UNIV

## (undated) DEVICE — SYR LUERLOK 30CC

## (undated) DEVICE — DRAINBAG,ANTI-REFLUX TOWER,L/F,2000ML,LL: Brand: MEDLINE

## (undated) DEVICE — GLV SURG BIOGEL LTX PF 8

## (undated) DEVICE — ST PRIM GRVTY NDLESS 3 INJ PORT 105IN

## (undated) DEVICE — PK CYSTO-TUR BASIC 10

## (undated) DEVICE — SYR LL TP 10ML STRL

## (undated) DEVICE — CATH FOLEY LUBRICATH 3WY 22F 30CC